# Patient Record
Sex: FEMALE | Race: WHITE | Employment: OTHER | URBAN - METROPOLITAN AREA
[De-identification: names, ages, dates, MRNs, and addresses within clinical notes are randomized per-mention and may not be internally consistent; named-entity substitution may affect disease eponyms.]

---

## 2017-03-10 DIAGNOSIS — Z12.31 ENCOUNTER FOR SCREENING MAMMOGRAM FOR BREAST CANCER: Primary | ICD-10-CM

## 2017-05-10 ENCOUNTER — APPOINTMENT (OUTPATIENT)
Dept: MAMMOGRAPHY | Facility: OTHER | Age: 67
End: 2017-05-10
Attending: NURSE PRACTITIONER
Payer: COMMERCIAL

## 2017-05-10 ENCOUNTER — OFFICE VISIT (OUTPATIENT)
Dept: FAMILY MEDICINE | Facility: OTHER | Age: 67
End: 2017-05-10
Attending: NURSE PRACTITIONER
Payer: COMMERCIAL

## 2017-05-10 VITALS
DIASTOLIC BLOOD PRESSURE: 70 MMHG | BODY MASS INDEX: 23.46 KG/M2 | SYSTOLIC BLOOD PRESSURE: 110 MMHG | HEIGHT: 66 IN | WEIGHT: 146 LBS | HEART RATE: 72 BPM | RESPIRATION RATE: 14 BRPM

## 2017-05-10 DIAGNOSIS — M85.80 OSTEOPENIA: ICD-10-CM

## 2017-05-10 DIAGNOSIS — K21.00 GASTROESOPHAGEAL REFLUX DISEASE WITH ESOPHAGITIS: ICD-10-CM

## 2017-05-10 DIAGNOSIS — Z00.00 ROUTINE GENERAL MEDICAL EXAMINATION AT A HEALTH CARE FACILITY: Primary | ICD-10-CM

## 2017-05-10 DIAGNOSIS — E78.5 HYPERLIPIDEMIA LDL GOAL <100: ICD-10-CM

## 2017-05-10 DIAGNOSIS — Z11.59 NEED FOR HEPATITIS C SCREENING TEST: ICD-10-CM

## 2017-05-10 DIAGNOSIS — Z71.89 ADVANCED CARE PLANNING/COUNSELING DISCUSSION: ICD-10-CM

## 2017-05-10 DIAGNOSIS — Z86.0100 HISTORY OF COLONIC POLYPS: ICD-10-CM

## 2017-05-10 DIAGNOSIS — Z79.899 TAKING A STATIN MEDICATION: ICD-10-CM

## 2017-05-10 DIAGNOSIS — Z78.0 MENOPAUSE PRESENT: ICD-10-CM

## 2017-05-10 LAB
ALBUMIN SERPL-MCNC: 4.4 G/DL (ref 3.4–5)
ALBUMIN UR-MCNC: NEGATIVE MG/DL
ALP SERPL-CCNC: 54 U/L (ref 40–150)
ALT SERPL W P-5'-P-CCNC: 31 U/L (ref 0–50)
ANION GAP SERPL CALCULATED.3IONS-SCNC: 11 MMOL/L (ref 3–14)
APPEARANCE UR: CLEAR
AST SERPL W P-5'-P-CCNC: 27 U/L (ref 0–45)
BILIRUB DIRECT SERPL-MCNC: 0.3 MG/DL (ref 0–0.2)
BILIRUB SERPL-MCNC: 1.3 MG/DL (ref 0.2–1.3)
BILIRUB UR QL STRIP: NEGATIVE
BUN SERPL-MCNC: 13 MG/DL (ref 7–30)
CALCIUM SERPL-MCNC: 9 MG/DL (ref 8.5–10.1)
CHLORIDE SERPL-SCNC: 104 MMOL/L (ref 94–109)
CHOLEST SERPL-MCNC: 174 MG/DL
CO2 SERPL-SCNC: 27 MMOL/L (ref 20–32)
COLOR UR AUTO: YELLOW
CREAT SERPL-MCNC: 0.9 MG/DL (ref 0.52–1.04)
GFR SERPL CREATININE-BSD FRML MDRD: 62 ML/MIN/1.7M2
GLUCOSE SERPL-MCNC: 96 MG/DL (ref 70–99)
GLUCOSE UR STRIP-MCNC: NEGATIVE MG/DL
HDLC SERPL-MCNC: 74 MG/DL
HGB UR QL STRIP: NEGATIVE
KETONES UR STRIP-MCNC: NEGATIVE MG/DL
LDLC SERPL CALC-MCNC: 77 MG/DL
LEUKOCYTE ESTERASE UR QL STRIP: NEGATIVE
NITRATE UR QL: NEGATIVE
NONHDLC SERPL-MCNC: 100 MG/DL
PH UR STRIP: 6 PH (ref 5–7)
POTASSIUM SERPL-SCNC: 3.7 MMOL/L (ref 3.4–5.3)
PROT SERPL-MCNC: 7.6 G/DL (ref 6.8–8.8)
SODIUM SERPL-SCNC: 142 MMOL/L (ref 133–144)
SP GR UR STRIP: 1.02 (ref 1–1.03)
TRIGL SERPL-MCNC: 114 MG/DL
TSH SERPL DL<=0.005 MIU/L-ACNC: 0.97 MU/L (ref 0.4–4)
URN SPEC COLLECT METH UR: NORMAL
UROBILINOGEN UR STRIP-ACNC: 0.2 EU/DL (ref 0.2–1)

## 2017-05-10 PROCEDURE — 80076 HEPATIC FUNCTION PANEL: CPT | Performed by: NURSE PRACTITIONER

## 2017-05-10 PROCEDURE — 99000 SPECIMEN HANDLING OFFICE-LAB: CPT | Performed by: NURSE PRACTITIONER

## 2017-05-10 PROCEDURE — 84443 ASSAY THYROID STIM HORMONE: CPT | Performed by: NURSE PRACTITIONER

## 2017-05-10 PROCEDURE — 36415 COLL VENOUS BLD VENIPUNCTURE: CPT | Performed by: NURSE PRACTITIONER

## 2017-05-10 PROCEDURE — 80061 LIPID PANEL: CPT | Performed by: NURSE PRACTITIONER

## 2017-05-10 PROCEDURE — G0202 SCR MAMMO BI INCL CAD: HCPCS | Mod: TC | Performed by: RADIOLOGY

## 2017-05-10 PROCEDURE — 86803 HEPATITIS C AB TEST: CPT | Mod: 90 | Performed by: NURSE PRACTITIONER

## 2017-05-10 PROCEDURE — 80048 BASIC METABOLIC PNL TOTAL CA: CPT | Performed by: NURSE PRACTITIONER

## 2017-05-10 PROCEDURE — 99397 PER PM REEVAL EST PAT 65+ YR: CPT | Performed by: NURSE PRACTITIONER

## 2017-05-10 PROCEDURE — 81003 URINALYSIS AUTO W/O SCOPE: CPT | Performed by: NURSE PRACTITIONER

## 2017-05-10 ASSESSMENT — ANXIETY QUESTIONNAIRES
5. BEING SO RESTLESS THAT IT IS HARD TO SIT STILL: NOT AT ALL
IF YOU CHECKED OFF ANY PROBLEMS ON THIS QUESTIONNAIRE, HOW DIFFICULT HAVE THESE PROBLEMS MADE IT FOR YOU TO DO YOUR WORK, TAKE CARE OF THINGS AT HOME, OR GET ALONG WITH OTHER PEOPLE: NOT DIFFICULT AT ALL
3. WORRYING TOO MUCH ABOUT DIFFERENT THINGS: SEVERAL DAYS
2. NOT BEING ABLE TO STOP OR CONTROL WORRYING: NOT AT ALL
1. FEELING NERVOUS, ANXIOUS, OR ON EDGE: NOT AT ALL
6. BECOMING EASILY ANNOYED OR IRRITABLE: NOT AT ALL
GAD7 TOTAL SCORE: 1
4. TROUBLE RELAXING: NOT AT ALL
7. FEELING AFRAID AS IF SOMETHING AWFUL MIGHT HAPPEN: NOT AT ALL

## 2017-05-10 NOTE — NURSING NOTE
"Chief Complaint   Patient presents with     Physical     mammo sceduled this morning, colonoscopy due     Chronic Disease Management     fasting       Initial /70 (BP Location: Right arm, Patient Position: Chair, Cuff Size: Adult Regular)  Pulse 72  Resp 14  Ht 5' 6\" (1.676 m)  Wt 146 lb (66.2 kg)  BMI 23.57 kg/m2 Estimated body mass index is 23.57 kg/(m^2) as calculated from the following:    Height as of this encounter: 5' 6\" (1.676 m).    Weight as of this encounter: 146 lb (66.2 kg).  Medication Reconciliation: complete     eDbora Rodriguez      "

## 2017-05-10 NOTE — MR AVS SNAPSHOT
After Visit Summary   5/10/2017    Farida Baldwin    MRN: [id number]           Patient Information     Date Of Birth          1950        Visit Information        Provider Department      5/10/2017 9:00 AM Irma Buchanan NP [facility]        Today's Diagnoses     Routine general medical examination at a health care facility    -  1    History of colonic polyps - tubular adenoma - colonoscopy every 2 years        Hyperlipidemia LDL goal <100        Taking a statin medication        Gastroesophageal reflux disease with esophagitis        Osteopenia        Need for hepatitis C screening test        Menopause present        Advanced care planning/counseling discussion          Care Instructions        1. Routine general medical examination at a health care facility  - UA reflex to Microscopic  - TSH with free T4 reflex  - Annual physical 1 year    2. History of colonic polyps - tubular adenoma - colonoscopy every 2 years  - Next colonoscopy is due in November    3. Hyperlipidemia LDL goal <100  - Medication as prescribed  -  Low fat diet  - Fish oil 3 daily  - Lipid Profile  - Basic metabolic panel  - TSH with free T4 reflex    4. Taking a statin medication  - Hepatic panel    5. Gastroesophageal reflux disease with esophagitis  - Continue plan of care    6. Osteopenia  - DX Hip/Pelvis/Spine  - Calcium (caltrate) per bottle daily  - Vitamin D3 2000U OTC  - If your bone density score is low lets consider Reclast infusion annually with repeat Dexa in 2 years    7. Need for hepatitis C screening test  - Hepatitis C antibody    8. Menopause present  - DX Hip/Pelvis/Spine - as above    9. Advanced care planning/counseling discussion  - Addressed        [facility]  [phone]            Follow-ups after your visit        Your next 10 appointments already scheduled     May 10, 2017 10:00 AM CDT   MAMMOGRAM with MT MAMMOGRAM   [facility] ([facility] )    [address] [address]   [phone]           Do not wear any body powder, lotions, deodorant or perfume the day of the exam. Bring a list of all medications, especially hormones.  If your last mammogram was not done at [facility], please bring your mammogram films. We will need the name of your MD/PA to send a copy of your report.              Who to contact     If you have questions or need follow up information about today's clinic visit or your schedule please contact [facility] directly at [phone].  Normal or non-critical lab and imaging results will be communicated to you by InterviewBesthart, letter or phone within 4 business days after the clinic has received the results. If you do not hear from us within 7 days, please contact the clinic through QuanDxt or phone. If you have a critical or abnormal lab result, we will notify you by phone as soon as possible.  Submit refill requests through [facility] or call your pharmacy and they will forward the refill request to us. Please allow 3 business days for your refill to be completed.          Additional Information About Your Visit        MyChart Information     [facility] lets you send messages to your doctor, view your test results, renew your prescriptions, schedule appointments and more. To sign up, go to [email or web address] . Click on \"Log in\" on the left side of the screen, which will take you to the Welcome page. Then click on \"Sign up Now\" on the right side of the page.     You will be asked to enter the access code listed below, as well as some personal information. Please follow the directions to create your username and password.     Your access code is: [email or web address]  Expires: 2017  9:50 AM     Your access code will  in 90 days. If you need help or a new code, please call your [facility] clinic or [phone].        Care EveryWhere ID     This is your Care EveryWhere ID. This could be used by other organizations to " "access your [place] medical records  [id number]        Your Vitals Were     Pulse Respirations Height BMI (Body Mass Index)          72 14 5' 6\" (1.676 m) 23.57 kg/m2         Blood Pressure from Last 3 Encounters:   05/10/17 110/70   05/04/16 116/70   02/12/16 130/76    Weight from Last 3 Encounters:   05/10/17 146 lb (66.2 kg)   05/04/16 154 lb (69.9 kg)   02/12/16 153 lb (69.4 kg)              We Performed the Following     *UA reflex to Microscopic     Basic metabolic panel     DX Hip/Pelvis/Spine     Hepatic panel     Hepatitis C antibody     Lipid Profile     TSH with free T4 reflex          Today's Medication Changes          These changes are accurate as of: 5/10/17  9:50 AM.  If you have any questions, ask your nurse or doctor.               These medicines have changed or have updated prescriptions.        Dose/Directions    aspirin 81 MG EC tablet   This may have changed:  when to take this   Used for:  Hyperlipidemia LDL goal < 100        Dose:  81 mg   Take 1 tablet (81 mg) by mouth daily   Quantity:  90 tablet   Refills:  3                Primary Care Provider Office Phone # Fax #    Irma JAMES Buchanan [phone]       [address] [address]        Thank you!     Thank you for choosing [facility]  for your care. Our goal is always to provide you with excellent care. Hearing back from our patients is one way we can continue to improve our services. Please take a few minutes to complete the written survey that you may receive in the mail after your visit with us. Thank you!             Your Updated Medication List - Protect others around you: Learn how to safely use, store and throw away your medicines at www.disposemymeds.org.          This list is accurate as of: 5/10/17  9:50 AM.  Always use your most recent med list.                   Brand Name Dispense Instructions for use    aspirin 81 MG EC tablet     90 tablet    Take 1 tablet (81 mg) by " mouth daily       calcium-vitamin D 600-400 MG-UNIT per tablet    CALTRATE     Take 1 tablet by mouth daily       MULTIVITAMIN/IRON PO      Take by mouth daily 1 daily       pantoprazole 40 MG EC tablet    PROTONIX    90 tablet    TAKE 1 TABLET BY MOUTH DAILY 30 TO 60 MINUTES BEFORE A MEAL       pravastatin 20 MG tablet    PRAVACHOL    90 tablet    TAKE 1 TABLET BY MOUTH ONCE DAILY       Vitamin C 500 MG Caps      Take  by mouth. 1 cap daily       VITAMIN D3 PO      Take 2,000 Units by mouth daily       VITAMIN E      1 tab daily

## 2017-05-10 NOTE — PATIENT INSTRUCTIONS
1. Routine general medical examination at a health care facility  - UA reflex to Microscopic  - TSH with free T4 reflex  - Annual physical 1 year    2. History of colonic polyps - tubular adenoma - colonoscopy every 2 years  - Next colonoscopy is due in November    3. Hyperlipidemia LDL goal <100  - Medication as prescribed  -  Low fat diet  - Fish oil 3 daily  - Lipid Profile  - Basic metabolic panel  - TSH with free T4 reflex    4. Taking a statin medication  - Hepatic panel    5. Gastroesophageal reflux disease with esophagitis  - Continue plan of care    6. Osteopenia  - DX Hip/Pelvis/Spine  - Calcium (caltrate) per bottle daily  - Vitamin D3 2000U OTC  - If your bone density score is low lets consider Reclast infusion annually with repeat Dexa in 2 years    7. Need for hepatitis C screening test  - Hepatitis C antibody    8. Menopause present  - DX Hip/Pelvis/Spine - as above    9. Advanced care planning/counseling discussion  - Addressed        Irma JAVED  [phone]

## 2017-05-10 NOTE — PROGRESS NOTES
CC:  Yearly Well-Woman Exam  Chief Complaint   Patient presents with     Physical     mammo sceduled this morning, colonoscopy due     Chronic Disease Management     fasting       HPI:  Farida Baldwin is a 66 year old female who present today for yearly exam.        Mammo will be done today.  Last was 3/16 - normal  Last Dexa scan - 3 years, is due  Last colonoscopy - UTD, due in November - Dr. Benjamin  BSE monthly  Dental and Eye examinations are UTD      BONE DENSITOMETRY  CLINICAL HISTORY: Postmenopausal female. Screening for  osteoporosis.  COMPARISON: None.  FINDINGS: Bone mineral density in the left femoral neck is 0.725  G/cm2, with a T-score of -1.1. Bone mineral density in the L1  through L4 vertebral bodies is 1.007 G/cm2, with a T-score of -0.4.     IMPRESSION:   OSTEOPENIA.     Exam Date: Apr 25, 2014 11:05:00 AM  Author: MAXIMILIANO MCCURDY  This report is final and signed      Past Medical History:   Diagnosis Date     Gastroesophageal reflux disease with esophagitis 1/27/2016     Hyperlipidemia LDL goal < 100 9/7/2012     Osteopenia 4/25/2014     Taking a statin medication 5/10/2017     Tubulovillous adenoma polyp of colon 9/7/2012       Past Surgical History:   Procedure Laterality Date     APPENDECTOMY  1956     BIOPSY      polypectomy-benign     COLONOSCOPY  5/17/2012     COLONOSCOPY  5/30/2013    Procedure: COLONOSCOPY;  COLONOSCOPY WITH BIOPSIES;  Surgeon: Tana Benjamin MD;  Location: [facility]     COLONOSCOPY  11/12/2013    Procedure: COLONOSCOPY;  WHOLE COLON COLONOSCOPY WITH POLYPECTOMY;  Surgeon: Tana Benjamin MD;  Location: [facility]     COLONOSCOPY N/A 11/23/2015    Procedure: COLONOSCOPY;  Surgeon: Tana Benjamin MD;  Location: [facility]     ESOPHAGOSCOPY, GASTROSCOPY, DUODENOSCOPY (EGD), COMBINED N/A 2/12/2016    Procedure: COMBINED ESOPHAGOSCOPY, GASTROSCOPY, DUODENOSCOPY (EGD);  Surgeon: Arthur Alaniz DO;  Location: [facility]     GYN SURGERY      complete hyst     Laproscopic-assisted  "resection of right colon neoplasm  2012     TONSILLECTOMY  1955       Current Outpatient Prescriptions   Medication     pantoprazole (PROTONIX) 40 MG enteric coated tablet     pravastatin (PRAVACHOL) 20 MG tablet     Cholecalciferol (VITAMIN D3 PO)     calcium-vitamin D (CALTRATE) 600-400 MG-UNIT per tablet     aspirin 81 MG EC tablet     Multiple Vitamins-Iron (MULTIVITAMIN/IRON PO)     VITAMIN E     Ascorbic Acid (VITAMIN C) 500 MG CAPS     No current facility-administered medications for this visit.        Allergies   Allergen Reactions     Fosamax [Alendronic Acid] Cramps     Muscle cramps       Family History   Problem Relation Age of Onset     Other - See Comments Father      (82 Diagnosis); cause of death     C.A.D. Father      CANCER Mother 38     pancreatic       Social History     Social History     Marital status:      Spouse name: N/A     Number of children: N/A     Years of education: N/A     Social History Main Topics     Smoking status: Former Smoker     Packs/day: 1.00     Years: 25.00     Types: Cigarettes     Smokeless tobacco: Never Used      Comment: year quit 1989     Alcohol use Yes      Comment: 1 beer daily. last drink, last night.     Drug use: No     Sexual activity: Yes     Partners: Male     Other Topics Concern     Blood Transfusions Yes     Caffeine Concern Yes     coffee - 4 cups daily     Parent/Sibling W/ Cabg, Mi Or Angioplasty Before 65f 55m? No     Social History Narrative       REVIEW OF SYSTEMS  Skin: negative  Eyes: negative  Ears/Nose/Throat: negative  Respiratory: No concerns  Breast:  Self examinations normal  Cardiovascular: negative  Gastrointestinal: negative  Genitourinary: negative  Musculoskeletal: negative  Neurologic: negative  Psychiatric: negative  Hematologic/Lymphatic/Immunologic: negative  Endocrine: negative      OBJECTIVE:  /70 (BP Location: Right arm, Patient Position: Chair, Cuff Size: Adult Regular)  Pulse 72  Resp 14  Ht 5' 6\" (1.676 m)  " Wt 146 lb (66.2 kg)  BMI 23.57 kg/m2     Constitutional: healthy, alert, no distress and cooperative  Head: Normocephalic. No masses, lesions, tenderness or abnormalities  Neck: Neck supple. No adenopathy. Thyroid symmetric, normal size,, Carotids without bruits.  ENT: ENT exam normal, no neck nodes or sinus tenderness  Cardiovascular: negative, PMI normal. No lifts, heaves, or thrills. RRR. No murmurs, clicks gallops or rub  Respiratory: negative, Percussion normal. Good diaphragmatic excursion. Lungs clear  Breast:  Examination normal  Gastrointestinal: Abdomen soft, non-tender. BS normal. No masses, organomegaly  Musculoskeletal: extremities normal- no gross deformities noted, gait normal and normal muscle tone  Skin: no suspicious lesions or rashes  Neurologic: Gait normal. Reflexes normal and symmetric. Sensation grossly WNL.  Psychiatric: mentation appears normal and affect normal/bright  Hematologic/Lymphatic/Immunologic: normal ant/post cervical, axillary, supraclavicular nodes      PHQ-9 SCORE 1/27/2016 5/4/2016 5/10/2017   Total Score - - -   Total Score 0 0 0     JENNIFER-7 SCORE 5/10/2017   Total Score 1         1. Routine general medical examination at a health care facility  - UA reflex to Microscopic  - TSH with free T4 reflex  - Annual physical 1 year    2. History of colonic polyps - tubular adenoma - colonoscopy every 2 years  - Next colonoscopy is due in November    3. Hyperlipidemia LDL goal <100  - Medication as prescribed  -  Low fat diet  - Fish oil 3 daily  - Lipid Profile  - Basic metabolic panel  - TSH with free T4 reflex    4. Taking a statin medication  - Hepatic panel    5. Gastroesophageal reflux disease with esophagitis  - Continue plan of care    6. Osteopenia  - DX Hip/Pelvis/Spine  - Calcium (caltrate) per bottle daily  - Vitamin D3 2000U OTC  - If your bone density score is low lets consider Reclast infusion annually with repeat Dexa in 2 years    7. Need for hepatitis C screening  test  - Hepatitis C antibody    8. Menopause present  - DX Hip/Pelvis/Spine - as above    9. Advanced care planning/counseling discussion  - Addressed        [facility]  [phone]

## 2017-05-11 LAB — HCV AB SERPL QL IA: NORMAL

## 2017-05-11 ASSESSMENT — PATIENT HEALTH QUESTIONNAIRE - PHQ9: SUM OF ALL RESPONSES TO PHQ QUESTIONS 1-9: 0

## 2017-05-11 ASSESSMENT — ANXIETY QUESTIONNAIRES: GAD7 TOTAL SCORE: 1

## 2017-06-22 DIAGNOSIS — E78.5 HYPERLIPIDEMIA LDL GOAL <100: ICD-10-CM

## 2017-06-22 RX ORDER — PRAVASTATIN SODIUM 20 MG
20 TABLET ORAL DAILY
Qty: 90 TABLET | Refills: 2 | Status: SHIPPED | OUTPATIENT
Start: 2017-06-22 | End: 2017-11-21

## 2017-08-01 DIAGNOSIS — K21.00 GASTROESOPHAGEAL REFLUX DISEASE WITH ESOPHAGITIS: ICD-10-CM

## 2017-08-01 RX ORDER — PANTOPRAZOLE SODIUM 40 MG/1
TABLET, DELAYED RELEASE ORAL
Qty: 90 TABLET | Refills: 2 | Status: SHIPPED | OUTPATIENT
Start: 2017-08-01 | End: 2019-05-16

## 2017-11-14 ENCOUNTER — TELEPHONE (OUTPATIENT)
Dept: FAMILY MEDICINE | Facility: OTHER | Age: 67
End: 2017-11-14

## 2017-11-14 NOTE — TELEPHONE ENCOUNTER
Pt calls, reports had blood on paper after having a BM this morning.  Has zHX of polyps.  Due for colonoscopy this month, gets every 2 yrs per Dr. Benjamin.  Trying to get ahold of her office to schedule appt.  Transferred her to the nurse.  Also asked pt to call if needs help scheduling.

## 2017-11-21 ENCOUNTER — OFFICE VISIT (OUTPATIENT)
Dept: SURGERY | Facility: OTHER | Age: 67
End: 2017-11-21
Attending: SURGERY
Payer: COMMERCIAL

## 2017-11-21 VITALS
BODY MASS INDEX: 23.3 KG/M2 | SYSTOLIC BLOOD PRESSURE: 115 MMHG | WEIGHT: 145 LBS | OXYGEN SATURATION: 95 % | HEART RATE: 73 BPM | HEIGHT: 66 IN | RESPIRATION RATE: 18 BRPM | DIASTOLIC BLOOD PRESSURE: 75 MMHG | TEMPERATURE: 98.2 F

## 2017-11-21 DIAGNOSIS — Z86.0101 HISTORY OF ADENOMATOUS POLYP OF COLON: ICD-10-CM

## 2017-11-21 DIAGNOSIS — Z12.11 SPECIAL SCREENING FOR MALIGNANT NEOPLASMS, COLON: Primary | ICD-10-CM

## 2017-11-21 PROCEDURE — 99203 OFFICE O/P NEW LOW 30 MIN: CPT | Performed by: SURGERY

## 2017-11-21 RX ORDER — SODIUM, POTASSIUM,MAG SULFATES 17.5-3.13G
2 SOLUTION, RECONSTITUTED, ORAL ORAL SEE ADMIN INSTRUCTIONS
Qty: 2 BOTTLE | Refills: 0 | Status: SHIPPED | OUTPATIENT
Start: 2017-11-21 | End: 2018-02-21

## 2017-11-21 ASSESSMENT — PAIN SCALES - GENERAL: PAINLEVEL: NO PAIN (0)

## 2017-11-21 NOTE — PROGRESS NOTES
- most likely due to dehydration  - cont IVFs and monitor   Surgery Consult Clinic Note      RE: Farida Baldwin  : 1950    Chief Complaint:  colonoscopy    History of Present Illness:  Mrs. Baldwin is a very pleasant 67 year old female who I am seeing at the request of Irma Buchanan for evaluation of screening colon malignant neoplasm and consideration for colonoscopy.  She denies family history of colon or rectal cancer, blood in stool, changes in bowel habits, weight loss, abdominal pain.  Her colonoscopy in  was positive for a atypical adenomatous colon polyp. She underwent short interval colonoscopy in  that was positive for a large tubular adenoma at the hepatic flexure with a tattoo.  I have personally reviewed this operative and pathology report.  She was advised to undergo surveillance because of the short interval positive polyp.  She specifically denies fever, chills, nausea, vomiting, chest pain, shortness of breath or palpitations.      Medical history:  Past Medical History:   Diagnosis Date     Gastroesophageal reflux disease with esophagitis 2016     Hyperlipidemia LDL goal < 100 2012     Osteopenia 2014     Taking a statin medication 5/10/2017     Tubulovillous adenoma polyp of colon 2012       Surgical history:  Past Surgical History:   Procedure Laterality Date     APPENDECTOMY       BIOPSY      polypectomy-benign     COLONOSCOPY  2012     COLONOSCOPY  2013    Procedure: COLONOSCOPY;  COLONOSCOPY WITH BIOPSIES;  Surgeon: Tana Benjamin MD;  [facility]: [facility]     COLONOSCOPY  2013    Procedure: COLONOSCOPY;  WHOLE COLON COLONOSCOPY WITH POLYPECTOMY;  Surgeon: Tana Benjamin MD;  [facility]     COLONOSCOPY N/A 2015    Procedure: COLONOSCOPY;  Surgeon: Tana Benjamin MD;  [facility]     ESOPHAGOSCOPY, GASTROSCOPY, DUODENOSCOPY (EGD), COMBINED N/A 2016    Procedure: COMBINED ESOPHAGOSCOPY, GASTROSCOPY, DUODENOSCOPY (EGD);  Surgeon: Arthur Alaniz DO;  Location: [facility]     GYN  SURGERY      complete hyst     Laproscopic-assisted resection of right colon neoplasm  2012     TONSILLECTOMY  1955       Family history:  Family History   Problem Relation Age of Onset     Other - See Comments Father      (82 Diagnosis); cause of death     C.A.D. Father      CANCER Mother 38     pancreatic       Medications:  Current Outpatient Prescriptions   Medication Sig Dispense Refill     pantoprazole (PROTONIX) 40 MG EC tablet TAKE 1 TABLET BY MOUTH DAILY 30 TO 60 MINUTES BEFORE A MEAL 90 tablet 2     pravastatin (PRAVACHOL) 20 MG tablet Take 1 tablet (20 mg) by mouth daily 90 tablet 2     Cholecalciferol (VITAMIN D3 PO) Take 2,000 Units by mouth daily        calcium-vitamin D (CALTRATE) 600-400 MG-UNIT per tablet Take 1 tablet by mouth daily        aspirin 81 MG EC tablet Take 1 tablet (81 mg) by mouth daily (Patient taking differently: Take 81 mg by mouth three times a week ) 90 tablet 3     Multiple Vitamins-Iron (MULTIVITAMIN/IRON PO) Take by mouth daily 1 daily       VITAMIN E 1 tab daily       Ascorbic Acid (VITAMIN C) 500 MG CAPS Take  by mouth. 1 cap daily         Allergies:  The patientis allergic to fosamax [alendronic acid].  .  Social history:  Social History   Substance Use Topics     Smoking status: Former Smoker     Packs/day: 1.00     Years: 25.00     Types: Cigarettes     Smokeless tobacco: Never Used      Comment: year quit 1989     Alcohol use Yes      Comment: 1 beer daily. last drink, last night.     Marital status: .    Review of Systems:    Constitutional: Negative for fever, chills and weight loss.   HENT: Negative for ear pain, nosebleeds, congestion, sore throat, tinnitus and ear discharge.    Eyes: Negative for blurred vision, double vision, photophobia and pain.   Respiratory: Negative for cough, hemoptysis, shortness of breath, wheezing and stridor.    Cardiovascular: Negative for chest pain, palpitations and orthopnea.   Gastrointestinal: Negative for heartburn, nausea,  "vomiting, abdominal pain and blood in stool.   Genitourinary: Negative for urgency, frequency and hematuria.   Musculoskeletal: Negative for myalgias, back pain and joint pain.   Neurological: Negative for tingling, speech change and headaches.   Endo/Heme/Allergies: Does not bruise/bleed easily.   Psychiatric/Behavioral: Negative for depression, suicidal ideas and hallucinations. The patient is not nervous/anxious.    Physical Examination:  /75  Pulse 73  Temp 98.2  F (36.8  C)  Resp 18  Ht 5' 6\" (1.676 m)  Wt 145 lb (65.8 kg)  SpO2 95%  BMI 23.4 kg/m2  General: AAOx4, NAD, WN/WD, ambulating without assistance  HEENT:NCAT, EOMI, PERRL Sclerae anicteric; Trachea mideline, no JVD  Chest:   Clear to auscultation bilaterally.  Cardiac: S1S2 , regular rate and rhythm without additional sounds  Abdomen: Soft, ND/NT no rebound, no guarding, well healed right oblique incision  Extremities: Cursory exam unremarkable.  Skin: Warm, dry, < 2 sec cap refill  Neuro: CN 2-12 grossly intact, no focal deficit, GCS 15  Psych: happy, calm, asks appropriate questions      Assessment/Plan:  #1 Screening colon malignant neoplasm  #2 Personal history of adenomatous colon polyps    Thank you for the consult.  Mrs. Baldwin and I had a long and imelda discussion about colonoscopies.  The indications, risks, benefits, althernatives and technical aspects of whole colon colonoscopy were outlined with risks including, but not limited to, perforation, bleeding and inability to visualize entire colon.  Management of each was reviewed including the risk for life saving surgery and possible admittance to the ICU.  The need of mechanical preparation of the colon was reviewed along with the use of monitored anesthetic care which is needed to ensure proper visualization and safety concerns should biopsy be needed.  The patient's questions were asked and answered.  Scheduled first available date.          Dr Alaniz  [facility] " [facility]  [address], [address]  [place]    [address]    Referring Provider:  No referring provider defined for this encounter.     Primary Care Provider:  Irma Buchanan

## 2017-11-21 NOTE — PATIENT INSTRUCTIONS
Thank you for allowing Dr. Alaniz and our surgical team to participate in your care.  If you have a scheduling or an appointment question please contact [facility] Unit Coordinator at her direct line [phone].   ALL nursing questions or concerns can be directed to Stephanie at: [phone]     You are scheduled for a: colonoscopy  Your procedure date is: 12/13/17    You need a friend or family member available to drive you home AND stay with you for 24 hours after you leave the hospital. You will not be allowed to drive yourself. IF you need to take a taxi or the bus you MUST have a responsible person to ride with you. YOUR PROCEDURE WILL BE CANCELLED IF YOU DO NOT HAVE A RESPONSIBLE ADULT TO DRIVE YOU HOME.       You CANNOT have anything to eat or drink after midnight the night before your surgery, ncluding water and coffee. Your stomach needs to be completely empty. Do NOT chew gum, suck on hard candy, or smoke. You can brush your teeth the morning of surgery.       You need to call our Surgery Education Nurses 1-2 weeks prior to your surgery date at  [phone] or toll free [phone]. Please have you medication and allergy lists ready.      Stop your aspirin or other NSAIDs(Ibuprofen, Motrin, Aleve, Celebrex, Naproxen, etc...) 7 days before your surgery.      Hospital admitting will call you the day before your surgery with your arrival time. If you are scheduled on a Monday admitting will call you the Friday before.      Please call your primary care physician if you should become ill within 24 hours of scheduled surgery. (ex.vomiting, diarrhea, fever)  Surgery Education will contact you the day before your procedure between the hours of noon and 5 pm with the time you need to register in admitting at the hospital. Call Stephanie with any questions [phone]  You will need to stop your multivitamin for the five days you are on the low fiber diet.   Day of surgery hold all medications until  you arrive home from your procedure at which time you may resume all of them like normal.

## 2017-11-21 NOTE — NURSING NOTE
"Chief Complaint   Patient presents with     Consult     last colonoscopy 2015/ no family history of colon cancer/ no changes in bowels        Initial /75  Pulse 73  Temp 98.2  F (36.8  C)  Resp 18  Ht 5' 6\" (1.676 m)  Wt 145 lb (65.8 kg)  SpO2 95%  BMI 23.4 kg/m2 Estimated body mass index is 23.4 kg/(m^2) as calculated from the following:    Height as of this encounter: 5' 6\" (1.676 m).    Weight as of this encounter: 145 lb (65.8 kg).  Medication Reconciliation: complete   Estefanía Frances    "

## 2017-12-13 ENCOUNTER — HOSPITAL ENCOUNTER (OUTPATIENT)
Facility: HOSPITAL | Age: 67
Discharge: HOME | End: 2017-12-13
Attending: SURGERY | Admitting: SURGERY
Payer: COMMERCIAL

## 2017-12-13 ENCOUNTER — ANESTHESIA EVENT (OUTPATIENT)
Dept: SURGERY | Facility: HOSPITAL | Age: 67
End: 2017-12-13
Payer: COMMERCIAL

## 2017-12-13 ENCOUNTER — ANESTHESIA (OUTPATIENT)
Dept: SURGERY | Facility: HOSPITAL | Age: 67
End: 2017-12-13
Payer: COMMERCIAL

## 2017-12-13 VITALS
RESPIRATION RATE: 16 BRPM | OXYGEN SATURATION: 88 % | WEIGHT: 144 LBS | DIASTOLIC BLOOD PRESSURE: 70 MMHG | SYSTOLIC BLOOD PRESSURE: 126 MMHG | TEMPERATURE: 97.7 F | BODY MASS INDEX: 23.24 KG/M2

## 2017-12-13 PROCEDURE — 36000050 ZZH SURGERY LEVEL 2 1ST 30 MIN: Performed by: SURGERY

## 2017-12-13 PROCEDURE — 45385 COLONOSCOPY W/LESION REMOVAL: CPT | Mod: PT | Performed by: ANESTHESIOLOGY

## 2017-12-13 PROCEDURE — 37000009 ZZH ANESTHESIA TECHNICAL FEE, EACH ADDTL 15 MIN: Performed by: SURGERY

## 2017-12-13 PROCEDURE — 01999 UNLISTED ANES PROCEDURE: CPT | Performed by: NURSE ANESTHETIST, CERTIFIED REGISTERED

## 2017-12-13 PROCEDURE — 25000128 H RX IP 250 OP 636: Performed by: NURSE ANESTHETIST, CERTIFIED REGISTERED

## 2017-12-13 PROCEDURE — 45380 COLONOSCOPY AND BIOPSY: CPT | Mod: PT | Performed by: SURGERY

## 2017-12-13 PROCEDURE — 25000125 ZZHC RX 250: Performed by: NURSE ANESTHETIST, CERTIFIED REGISTERED

## 2017-12-13 PROCEDURE — 37000008 ZZH ANESTHESIA TECHNICAL FEE, 1ST 30 MIN: Performed by: SURGERY

## 2017-12-13 PROCEDURE — 27210794 ZZH OR GENERAL SUPPLY STERILE: Performed by: SURGERY

## 2017-12-13 PROCEDURE — 71000027 ZZH RECOVERY PHASE 2 EACH 15 MINS: Performed by: SURGERY

## 2017-12-13 PROCEDURE — 88305 TISSUE EXAM BY PATHOLOGIST: CPT | Mod: TC | Performed by: SURGERY

## 2017-12-13 PROCEDURE — 36000052 ZZH SURGERY LEVEL 2 EA 15 ADDTL MIN: Performed by: SURGERY

## 2017-12-13 PROCEDURE — 40000305 ZZH STATISTIC PRE PROC ASSESS I: Performed by: SURGERY

## 2017-12-13 PROCEDURE — 25000128 H RX IP 250 OP 636: Performed by: ANESTHESIOLOGY

## 2017-12-13 RX ORDER — HYDRALAZINE HYDROCHLORIDE 20 MG/ML
2.5-5 INJECTION INTRAMUSCULAR; INTRAVENOUS EVERY 10 MIN PRN
Status: DISCONTINUED | OUTPATIENT
Start: 2017-12-13 | End: 2017-12-13 | Stop reason: HOSPADM

## 2017-12-13 RX ORDER — SODIUM CHLORIDE, SODIUM LACTATE, POTASSIUM CHLORIDE, CALCIUM CHLORIDE 600; 310; 30; 20 MG/100ML; MG/100ML; MG/100ML; MG/100ML
INJECTION, SOLUTION INTRAVENOUS CONTINUOUS
Status: DISCONTINUED | OUTPATIENT
Start: 2017-12-13 | End: 2017-12-13 | Stop reason: HOSPADM

## 2017-12-13 RX ORDER — FENTANYL CITRATE 50 UG/ML
25-50 INJECTION, SOLUTION INTRAMUSCULAR; INTRAVENOUS
Status: DISCONTINUED | OUTPATIENT
Start: 2017-12-13 | End: 2017-12-13 | Stop reason: HOSPADM

## 2017-12-13 RX ORDER — LIDOCAINE HYDROCHLORIDE 20 MG/ML
INJECTION, SOLUTION INFILTRATION; PERINEURAL PRN
Status: DISCONTINUED | OUTPATIENT
Start: 2017-12-13 | End: 2017-12-13

## 2017-12-13 RX ORDER — ONDANSETRON 2 MG/ML
4 INJECTION INTRAMUSCULAR; INTRAVENOUS EVERY 30 MIN PRN
Status: DISCONTINUED | OUTPATIENT
Start: 2017-12-13 | End: 2017-12-13 | Stop reason: HOSPADM

## 2017-12-13 RX ORDER — DEXAMETHASONE SODIUM PHOSPHATE 4 MG/ML
4 INJECTION, SOLUTION INTRA-ARTICULAR; INTRALESIONAL; INTRAMUSCULAR; INTRAVENOUS; SOFT TISSUE EVERY 10 MIN PRN
Status: DISCONTINUED | OUTPATIENT
Start: 2017-12-13 | End: 2017-12-13 | Stop reason: HOSPADM

## 2017-12-13 RX ORDER — PROMETHAZINE HYDROCHLORIDE 25 MG/ML
12.5 INJECTION, SOLUTION INTRAMUSCULAR; INTRAVENOUS
Status: DISCONTINUED | OUTPATIENT
Start: 2017-12-13 | End: 2017-12-13 | Stop reason: HOSPADM

## 2017-12-13 RX ORDER — MEPERIDINE HYDROCHLORIDE 25 MG/ML
12.5 INJECTION INTRAMUSCULAR; INTRAVENOUS; SUBCUTANEOUS
Status: DISCONTINUED | OUTPATIENT
Start: 2017-12-13 | End: 2017-12-13 | Stop reason: HOSPADM

## 2017-12-13 RX ORDER — ALBUTEROL SULFATE 0.83 MG/ML
2.5 SOLUTION RESPIRATORY (INHALATION) EVERY 4 HOURS PRN
Status: DISCONTINUED | OUTPATIENT
Start: 2017-12-13 | End: 2017-12-13 | Stop reason: HOSPADM

## 2017-12-13 RX ORDER — LIDOCAINE 40 MG/G
CREAM TOPICAL
Status: DISCONTINUED | OUTPATIENT
Start: 2017-12-13 | End: 2017-12-13 | Stop reason: HOSPADM

## 2017-12-13 RX ORDER — NALOXONE HYDROCHLORIDE 0.4 MG/ML
.1-.4 INJECTION, SOLUTION INTRAMUSCULAR; INTRAVENOUS; SUBCUTANEOUS
Status: DISCONTINUED | OUTPATIENT
Start: 2017-12-13 | End: 2017-12-13 | Stop reason: HOSPADM

## 2017-12-13 RX ORDER — PROPOFOL 10 MG/ML
INJECTION, EMULSION INTRAVENOUS PRN
Status: DISCONTINUED | OUTPATIENT
Start: 2017-12-13 | End: 2017-12-13

## 2017-12-13 RX ORDER — ONDANSETRON 4 MG/1
4 TABLET, ORALLY DISINTEGRATING ORAL EVERY 30 MIN PRN
Status: DISCONTINUED | OUTPATIENT
Start: 2017-12-13 | End: 2017-12-13 | Stop reason: HOSPADM

## 2017-12-13 RX ADMIN — MIDAZOLAM 1 MG: 1 INJECTION INTRAMUSCULAR; INTRAVENOUS at 09:18

## 2017-12-13 RX ADMIN — PROPOFOL 20 MG: 10 INJECTION, EMULSION INTRAVENOUS at 09:22

## 2017-12-13 RX ADMIN — PROPOFOL 20 MG: 10 INJECTION, EMULSION INTRAVENOUS at 09:23

## 2017-12-13 RX ADMIN — PROPOFOL 20 MG: 10 INJECTION, EMULSION INTRAVENOUS at 09:37

## 2017-12-13 RX ADMIN — PROPOFOL 20 MG: 10 INJECTION, EMULSION INTRAVENOUS at 09:29

## 2017-12-13 RX ADMIN — PROPOFOL 20 MG: 10 INJECTION, EMULSION INTRAVENOUS at 09:45

## 2017-12-13 RX ADMIN — PROPOFOL 20 MG: 10 INJECTION, EMULSION INTRAVENOUS at 09:41

## 2017-12-13 RX ADMIN — PROPOFOL 20 MG: 10 INJECTION, EMULSION INTRAVENOUS at 09:33

## 2017-12-13 RX ADMIN — PROPOFOL 20 MG: 10 INJECTION, EMULSION INTRAVENOUS at 09:27

## 2017-12-13 RX ADMIN — PROPOFOL 20 MG: 10 INJECTION, EMULSION INTRAVENOUS at 09:21

## 2017-12-13 RX ADMIN — SODIUM CHLORIDE, POTASSIUM CHLORIDE, SODIUM LACTATE AND CALCIUM CHLORIDE 1000 ML: 600; 310; 30; 20 INJECTION, SOLUTION INTRAVENOUS at 07:54

## 2017-12-13 RX ADMIN — LIDOCAINE HYDROCHLORIDE 40 MG: 20 INJECTION, SOLUTION INFILTRATION; PERINEURAL at 09:20

## 2017-12-13 RX ADMIN — PROPOFOL 20 MG: 10 INJECTION, EMULSION INTRAVENOUS at 09:20

## 2017-12-13 RX ADMIN — PROPOFOL 20 MG: 10 INJECTION, EMULSION INTRAVENOUS at 09:25

## 2017-12-13 ASSESSMENT — LIFESTYLE VARIABLES: TOBACCO_USE: 1

## 2017-12-13 NOTE — IP AVS SNAPSHOT
MRN:[id number]                      After Visit Summary   12/13/2017    Farida Baldwin    MRN: [id number]           Thank you!     Thank you for choosing [facility] for your care. Our goal is always to provide you with excellent care. Hearing back from our patients is one way we can continue to improve our services. Please take a few minutes to complete the written survey that you may receive in the mail after you visit with us. Thank you!        Patient Information     Date Of Birth          1950        About your hospital stay     You were admitted on:  December 13, 2017 You last received care in the:  [facility]    You were discharged on:  December 13, 2017       Who to Call     For medical emergencies, please call 911.  For non-urgent questions about your medical care, please call your primary care provider or clinic, [phone]  For questions related to your surgery, please call your surgery clinic        Attending Provider     Provider Specialty    Arthur Alaniz, DO Surgery       Primary Care Provider Office Phone # Fax #    Irma ZarcoJAMES shepard [phone]      Further instructions from your care team           Post-Anesthesia Patient Instructions    IMMEDIATELY FOLLOWING SURGERY:  Do not drive or operate machinery for the first twenty four hours after surgery.  Do not make any important decisions for twenty four hours after surgery or while taking narcotic pain medications or sedatives.  If you develop intractable nausea and vomiting or a severe headache please notify your doctor immediately.    FOLLOW-UP:  Please make an appointment with your surgeon as instructed. You do not need to follow up with anesthesia unless specifically instructed to do so.    WOUND CARE INSTRUCTIONS (if applicable):  Keep a dry clean dressing on the anesthesia/puncture wound site if there is drainage.  Once the wound has quit draining you may leave it open to air.  Generally you  should leave the bandage intact for twenty four hours unless there is drainage.  If the epidural site drains for more than 36-48 hours please call the anesthesia department.    QUESTIONS?:  Please feel free to call your physician or the hospital  if you have any questions, and they will be happy to assist you.           INSTRUCTIONS AFTER COLONOSCOPY    WHEN YOU ARE BACK HOME:    Plan to rest for an hour or two after you get home.    You may have some cramping or pressure until you pass gas.    You may resume your regular medications.    Eat a small, light meal at first, and then gradually return to normal meal sizes.  If you had a polyp removed:    Slight bleeding may occur.  You may have a slight blood stain on the toilet paper after a bowel movement.    To lessen the chance of bleeding, avoid heavy exercise for ONE WEEK.  This includes heavy lifting, vigorous sport activities, and heavy physical labor.  You may resume your normal sexual activity.      Avoid aspirin or aspirin products if instructed by your doctor.    WHAT TO WATCH FOR:  Problems rarely occur after the exam; however, it is important for you to watch for early signs of possible problems.  If you have     Unusual pain in your abdomen    Nausea and vomiting that persists    Excessive bleeding    Black or bloody bowel movements    Fever or temperature above 100.6 F  Please call your doctor ([facility] [phone]) or go to the nearest hospital emergency room.    Pending Results     No orders found from 12/11/2017 to 12/14/2017.            Admission Information     Date & Time Provider Department Dept. Phone    12/13/2017 Arthur Alaniz,  [facility] [phone]      Your Vitals Were     Blood Pressure Temperature Respirations Weight Pulse Oximetry BMI (Body Mass Index)    104/69 97.7  F (36.5  C) (Oral) 16 65.3 kg (144 lb) 97% 23.24 kg/m2      MyChart Information     [employer] lets you send messages to your doctor, view  "your test results, renew your prescriptions, schedule appointments and more. To sign up, go to [email or web address] . Click on \"Log in\" on the left side of the screen, which will take you to the Welcome page. Then click on \"Sign up Now\" on the right side of the page.     You will be asked to enter the access code listed below, as well as some personal information. Please follow the directions to create your username and password.     Your access code is: [email or web address]  Expires: 2018 11:27 AM     Your access code will  in 90 days. If you need help or a new code, please call your [facility] clinic or [phone].        Care EveryWhere ID     This is your Care EveryWhere ID. This could be used by other organizations to access your [facility] medical records  [id number]        Equal Access to Services     LILA CONRAD : Meghna Leums, michelle dover, chris cortez, carolynn rogers . So [facility] [phone].    ATENCIÓN: Si habla español, tiene a valle disposición servicios gratuitos de asistencia lingüística. [place] al [phone].    We comply with applicable federal civil rights laws and [place] laws. We do not discriminate on the basis of race, color, national origin, age, disability, sex, sexual orientation, or gender identity.               Review of your medicines      CONTINUE these medicines which may have CHANGED, or have new prescriptions. If we are uncertain of the size of tablets/capsules you have at home, strength may be listed as something that might have changed.        Dose / Directions    aspirin 81 MG EC tablet   This may have changed:  when to take this   Used for:  Hyperlipidemia LDL goal < 100        Dose:  81 mg   Take 1 tablet (81 mg) by mouth daily   Quantity:  90 tablet   Refills:  3         CONTINUE these medicines which have NOT CHANGED        Dose / Directions    calcium-vitamin D 600-400 MG-UNIT per tablet   Commonly known as:  " CALTRATE        Dose:  1 tablet   Take 1 tablet by mouth daily   Refills:  0       MULTIVITAMIN/IRON PO        Take by mouth daily 1 daily   Refills:  0       Na Sulfate-K Sulfate-Mg Sulf solution   Commonly known as:  SUPREP BOWEL PREP   Used for:  Special screening for malignant neoplasms, colon        Dose:  2 Bottle   Take 354 mLs (2 Bottles) by mouth See Admin Instructions   Quantity:  2 Bottle   Refills:  0       pantoprazole 40 MG EC tablet   Commonly known as:  PROTONIX   Used for:  Gastroesophageal reflux disease with esophagitis        TAKE 1 TABLET BY MOUTH DAILY 30 TO 60 MINUTES BEFORE A MEAL   Quantity:  90 tablet   Refills:  2       PRAVASTATIN SODIUM PO        Dose:  40 mg   Take 40 mg by mouth daily   Refills:  0       Vitamin C 500 MG Caps        Take  by mouth. 1 cap daily   Refills:  0       VITAMIN D3 PO        Dose:  2000 Units   Take 2,000 Units by mouth daily   Refills:  0       VITAMIN E        1 tab daily   Refills:  0                Protect others around you: Learn how to safely use, store and throw away your medicines at www.disposemymeds.org.             Medication List: This is a list of all your medications and when to take them. Check marks below indicate your daily home schedule. Keep this list as a reference.      Medications           Morning Afternoon Evening Bedtime As Needed    aspirin 81 MG EC tablet   Take 1 tablet (81 mg) by mouth daily                                calcium-vitamin D 600-400 MG-UNIT per tablet   Commonly known as:  CALTRATE   Take 1 tablet by mouth daily                                MULTIVITAMIN/IRON PO   Take by mouth daily 1 daily                                Na Sulfate-K Sulfate-Mg Sulf solution   Commonly known as:  SUPREP BOWEL PREP   Take 354 mLs (2 Bottles) by mouth See Admin Instructions                                pantoprazole 40 MG EC tablet   Commonly known as:  PROTONIX   TAKE 1 TABLET BY MOUTH DAILY 30 TO 60 MINUTES BEFORE A MEAL                                 PRAVASTATIN SODIUM PO   Take 40 mg by mouth daily                                Vitamin C 500 MG Caps   Take  by mouth. 1 cap daily                                VITAMIN D3 PO   Take 2,000 Units by mouth daily                                VITAMIN E   1 tab daily

## 2017-12-13 NOTE — OR NURSING
Patient and responsible adult given discharge instructions with no questions regarding instructions. Oseas score 19. Pain level 0/10.  Discharged from unit via walking. Patient discharged to home.

## 2017-12-13 NOTE — ANESTHESIA CARE TRANSFER NOTE
Patient: Farida Baldwin    Procedure(s):  COLONOSCOPY WITH POLYPECTOMY - Wound Class: II-Clean Contaminated    Diagnosis: SCREENING  Diagnosis Additional Information: No value filed.    Anesthesia Type:   MAC     Note:  Airway :Nasal Cannula  Patient transferred to:Phase II  Handoff Report: Identifed the Patient, Identified the Reponsible Provider, Reviewed the pertinent medical history, Discussed the surgical course, Reviewed Intra-OP anesthesia mangement and issues during anesthesia, Set expectations for post-procedure period and Allowed opportunity for questions and acknowledgement of understanding      Vitals: (Last set prior to Anesthesia Care Transfer)    CRNA VITALS  12/13/2017 0921 - 12/13/2017 0951      12/13/2017             Resp Rate (set): 8                Electronically Signed By: JUAN PABLO Carney CRNA  December 13, 2017  9:51 AM

## 2017-12-13 NOTE — ANESTHESIA POSTPROCEDURE EVALUATION
Patient: Farida Baldwin    Procedure(s):  COLONOSCOPY WITH POLYPECTOMY - Wound Class: II-Clean Contaminated    Diagnosis:SCREENING  Diagnosis Additional Information: No value filed.    Anesthesia Type:  MAC    Note:  Anesthesia Post Evaluation    Patient location during evaluation: Phase 2 and Bedside  Patient participation: Able to fully participate in evaluation  Level of consciousness: awake and alert  Pain management: adequate  Airway patency: patent  Cardiovascular status: acceptable  Respiratory status: acceptable  Hydration status: stable  PONV: none     Anesthetic complications: None          Last vitals:  Vitals:    12/13/17 1020 12/13/17 1025 12/13/17 1030   BP: 105/59 128/66 126/70   Resp: 16 16 16   Temp:      SpO2: 98% 100% (!) 88%         Electronically Signed By: Pa Raman MD  December 13, 2017  10:56 AM

## 2017-12-13 NOTE — DISCHARGE INSTRUCTIONS
Post-Anesthesia Patient Instructions    IMMEDIATELY FOLLOWING SURGERY:  Do not drive or operate machinery for the first twenty four hours after surgery.  Do not make any important decisions for twenty four hours after surgery or while taking narcotic pain medications or sedatives.  If you develop intractable nausea and vomiting or a severe headache please notify your doctor immediately.    FOLLOW-UP:  Please make an appointment with your surgeon as instructed. You do not need to follow up with anesthesia unless specifically instructed to do so.    WOUND CARE INSTRUCTIONS (if applicable):  Keep a dry clean dressing on the anesthesia/puncture wound site if there is drainage.  Once the wound has quit draining you may leave it open to air.  Generally you should leave the bandage intact for twenty four hours unless there is drainage.  If the epidural site drains for more than 36-48 hours please call the anesthesia department.    QUESTIONS?:  Please feel free to call your physician or the hospital  if you have any questions, and they will be happy to assist you.           INSTRUCTIONS AFTER COLONOSCOPY    WHEN YOU ARE BACK HOME:    Plan to rest for an hour or two after you get home.    You may have some cramping or pressure until you pass gas.    You may resume your regular medications.    Eat a small, light meal at first, and then gradually return to normal meal sizes.  If you had a polyp removed:    Slight bleeding may occur.  You may have a slight blood stain on the toilet paper after a bowel movement.    To lessen the chance of bleeding, avoid heavy exercise for ONE WEEK.  This includes heavy lifting, vigorous sport activities, and heavy physical labor.  You may resume your normal sexual activity.      Avoid aspirin or aspirin products if instructed by your doctor.    WHAT TO WATCH FOR:  Problems rarely occur after the exam; however, it is important for you to watch for early signs of possible  problems.  If you have     Unusual pain in your abdomen    Nausea and vomiting that persists    Excessive bleeding    Black or bloody bowel movements    Fever or temperature above 100.6 F  Please call your doctor ([facility] [phone]) or go to the nearest hospital emergency room.

## 2017-12-13 NOTE — PROCEDURES
DATE OF SERVICE:  12/13/2017      PREOPERATIVE DIAGNOSIS:  Personal history of colon polyps.      POSTOPERATIVE DIAGNOSES:  Transverse colon polyp, diverticulosis, internal hemorrhoids.      PROCEDURE:  Colonoscopy, polypectomy x1.      INDICATION:  Screening colonoscopy.      SURGEON:  Arthur Alaniz DO      DESCRIPTION OF PROCEDURE:  Farida Baldwin was brought into the endoscopy suite and placed in the left lateral decubitus position.  After a preprocedural pause and attended monitored anesthesia was administered, the external anus was inspected and was normal.  Digital rectal exam was positive for hemorrhoids.  The colonoscope was inserted and advanced with a great deal of difficulty secondary to a floppy and redundant sigmoid and transverse colon.  The cecum was able to be achieved after multiple patient repositioning and 2-person abdominal pressure.  The cecum was identified by the appendiceal orifice and the ileocecal valve.  The prep was excellent.  Upon slow withdrawal of the colonoscope, approximately 95% of the colon mucosa was directly visualized.  The cecum and ascending colon were unremarkable.  In the very proximal transverse colon, right at the hepatic flexure, a very small sessile polyp was removed using cold biting forceps.  The rest of the transverse and descending colon were unremarkable.  In the sigmoid colon, there were multiple small and medium mouth diverticula without evidence of inflammation or bleeding.  The rectum was unremarkable.  Retroflexion was positive for grade I internal hemorrhoids.  The extra air was removed from the colon and the colonoscope withdrawn.  The patient tolerated the procedure well and was taken to postanesthesia care unit.  Timing for interval colonoscopy would depend upon the histologic evaluation of the polyps.         ARTHUR ALANIZ DO             D: 12/13/2017 10:03   T: 12/13/2017 10:17   MT: [place]      Name:     FARIDA BALDWIN   MRN:      [id number]         Account:        [id number]   :      1950           Procedure Date: 2017      Document: [id number]

## 2017-12-13 NOTE — IP AVS SNAPSHOT
[facility] Operating Room    [address] [place] [id number]    Phone:  [phone]                                       After Visit Summary   12/13/2017    Farida Baldwin    MRN: [id number]           After Visit Summary Signature Page     I have received my discharge instructions, and my questions have been answered. I have discussed any challenges I see with this plan with the nurse or doctor.    ..........................................................................................................................................  Patient/Patient Representative Signature      ..........................................................................................................................................  Patient Representative Print Name and Relationship to Patient    ..................................................               ................................................  Date                                            Time    ..........................................................................................................................................  Reviewed by Signature/Title    ...................................................              ..............................................  Date                                                            Time

## 2017-12-13 NOTE — ANESTHESIA PREPROCEDURE EVALUATION
Anesthesia Evaluation     . Pt has had prior anesthetic.            ROS/MED HX    ENT/Pulmonary:     (+)tobacco use (Quit 1989), Past use , . .    Neurologic:  - neg neurologic ROS     Cardiovascular:     (+) Dyslipidemia, ----. : . . . :. .       METS/Exercise Tolerance:     Hematologic:  - neg hematologic  ROS       Musculoskeletal:   (+) arthritis, , , -       GI/Hepatic:     (+) GERD bowel prep, Other GI/Hepatic s/p Lap-Assisted Resection of Right Colon CA  2012      Renal/Genitourinary:     (+) chronic renal disease (Stage 3 (moderate)), type: CRI,       Endo:  - neg endo ROS       Psychiatric:  - neg psychiatric ROS       Infectious Disease:  - neg infectious disease ROS       Malignancy:   (+) Malignancy History of GI  GI CA  Remission status post Surgery,         Other:    - neg other ROS                 Physical Exam  Normal systems: dental    Airway   Mallampati: II  TM distance: >3 FB  Neck ROM: full    Dental     Cardiovascular   Rhythm and rate: regular and normal      Pulmonary    breath sounds clear to auscultation                    Anesthesia Plan      History & Physical Review  History and physical reviewed and following examination; no interval change.    ASA Status:  3 .        Plan for MAC with Intravenous and Propofol induction. Maintenance will be TIVA.  Reason for MAC:  Other - see comments  PONV prophylaxis:  Ondansetron (or other 5HT-3)  Surgeon requests deep sedation. Patient is an ASA 3. Will provide MAC.      Postoperative Care  Postoperative pain management:  IV analgesics.      Consents  Anesthetic plan, risks, benefits and alternatives discussed with:  Patient..                          .

## 2017-12-13 NOTE — BRIEF OP NOTE
[facility] - Brief Operative Note    Pre-operative diagnosis: Personal history of colon polyps   Post-operative diagnosis Transverse colon polyp, diverticulosis, internal hemorrhoids   Procedure: Colonoscopy, polypectomy x 1   Surgeon: Arthur Alaniz DO   Anesthesia: Monitor Anesthesia Care    Estimated blood loss: 0   Blood transfusion: No transfusion was given during surgery   Drains: 0   Specimens: Transverse colon polyp   Findings: Small sessile polyp at hepatic flexure, sigmoid diverticulosis   Complications: None   Condition: Stable   Comments: Details included in dictated operative note.

## 2017-12-14 LAB — COPATH REPORT: NORMAL

## 2017-12-19 ENCOUNTER — TELEPHONE (OUTPATIENT)
Dept: SURGERY | Facility: OTHER | Age: 67
End: 2017-12-19

## 2017-12-19 NOTE — TELEPHONE ENCOUNTER
Patient returned Stephanie's call to get the results from her colonoscopy with Dr Alaniz. Patient can be reached at [phone], thanks.

## 2017-12-19 NOTE — TELEPHONE ENCOUNTER
Returned patients call, notified her of results and that she will need follow up colonoscopy in 5 years. Patient verbalized understanding. Estefanía Frances LPN

## 2018-02-21 ENCOUNTER — OFFICE VISIT (OUTPATIENT)
Dept: FAMILY MEDICINE | Facility: OTHER | Age: 68
End: 2018-02-21
Attending: NURSE PRACTITIONER
Payer: COMMERCIAL

## 2018-02-21 VITALS
TEMPERATURE: 99.3 F | RESPIRATION RATE: 14 BRPM | BODY MASS INDEX: 23.73 KG/M2 | SYSTOLIC BLOOD PRESSURE: 112 MMHG | HEART RATE: 81 BPM | WEIGHT: 147 LBS | OXYGEN SATURATION: 97 % | DIASTOLIC BLOOD PRESSURE: 68 MMHG

## 2018-02-21 DIAGNOSIS — Z12.31 ENCOUNTER FOR SCREENING MAMMOGRAM FOR BREAST CANCER: ICD-10-CM

## 2018-02-21 DIAGNOSIS — R50.9 FEVER, UNSPECIFIED FEVER CAUSE: Primary | ICD-10-CM

## 2018-02-21 DIAGNOSIS — L98.9 SKIN LESION: ICD-10-CM

## 2018-02-21 LAB
DEPRECATED S PYO AG THROAT QL EIA: NORMAL
FLUAV+FLUBV AG SPEC QL: NEGATIVE
FLUAV+FLUBV AG SPEC QL: NEGATIVE
SPECIMEN SOURCE: NORMAL
SPECIMEN SOURCE: NORMAL

## 2018-02-21 PROCEDURE — 87804 INFLUENZA ASSAY W/OPTIC: CPT | Performed by: NURSE PRACTITIONER

## 2018-02-21 PROCEDURE — 99214 OFFICE O/P EST MOD 30 MIN: CPT | Performed by: NURSE PRACTITIONER

## 2018-02-21 PROCEDURE — 87880 STREP A ASSAY W/OPTIC: CPT | Performed by: NURSE PRACTITIONER

## 2018-02-21 PROCEDURE — 87081 CULTURE SCREEN ONLY: CPT | Performed by: NURSE PRACTITIONER

## 2018-02-21 ASSESSMENT — ANXIETY QUESTIONNAIRES
1. FEELING NERVOUS, ANXIOUS, OR ON EDGE: NOT AT ALL
7. FEELING AFRAID AS IF SOMETHING AWFUL MIGHT HAPPEN: NOT AT ALL
GAD7 TOTAL SCORE: 0
5. BEING SO RESTLESS THAT IT IS HARD TO SIT STILL: NOT AT ALL
2. NOT BEING ABLE TO STOP OR CONTROL WORRYING: NOT AT ALL
3. WORRYING TOO MUCH ABOUT DIFFERENT THINGS: NOT AT ALL
6. BECOMING EASILY ANNOYED OR IRRITABLE: NOT AT ALL

## 2018-02-21 ASSESSMENT — PATIENT HEALTH QUESTIONNAIRE - PHQ9: 5. POOR APPETITE OR OVEREATING: NOT AT ALL

## 2018-02-21 NOTE — PATIENT INSTRUCTIONS
Results for orders placed or performed in visit on 02/21/18 (from the past 24 hour(s))   Influenza A/B antigen   Result Value Ref Range    Influenza A/B Agn Specimen Nares     Influenza A Negative NEG^Negative    Influenza B Negative NEG^Negative           ASSESSMENT/PLAN:     1. Fever, unspecified fever cause  - Influenza A/B antigen - negative  - Rapid strep screen - negative      Fluids  Rest  Humidity at home - add bacteriostatic solution to humidifier  OTC Mucinex liquid cough and cold  If cough develops  Add Zicam or other zinc daily until you feel better  Please go to the ER or UC if your symptoms worsen   Please return to clinic if unimproved        2. Encounter for screening mammogram for breast cancer  - MA Screening Digital Bilateral ([facility]); Future      3. Skin lesion  - DERMATOLOGY REFERRAL ordered, they will contact you        Irma Buchanan NP  [facility]

## 2018-02-21 NOTE — PROGRESS NOTES
"  SUBJECTIVE:   Farida Baldwin is a 67 year old female who presents to clinic today for the following health issues:    Patient presents with history of having a fever >102 F two weeks ago which lasted 3 days without any other symptoms other than \"being tired\". That fever resolved and she now has a fever of tightness in her chest and a non-productive \"dry cough\" which started 2 days ago and a temp of 102 F yesterday. Also reports general ache and tiredness. Pt denies having a influenza vaccine this season. See below for further details.   See below for further complaints.           Concern - right upper anterior tibial - non healing lesion - 8 mm - irregular border, raised -  for 1 year, bleeds, and is irritated.    Onset: Spot has been there for years and now for the last few months has been scabbed    Description:   Open/scabbed area on leg    Intensity: mild    Progression of Symptoms:  same    Accompanying Signs & Symptoms:  none    Previous history of similar problem:   none    Precipitating factors:   Worsened by: none    Alleviating factors:  Improved by: none          Acute Illness   Acute illness concerns: fever, chest pressure and cough  Onset: Monday    Fever: YES    Chills/Sweats: YES    Headache (location?): YES    Sinus Pressure:no    Conjunctivitis:  no    Ear Pain: no    Rhinorrhea: no     Congestion: YES- and chest tightness    Sore Throat: no      Cough: YES - dry non-productive    Wheeze: no    Decreased Appetite: YES- nothing tastes good    Nausea: no     Vomiting: no     Diarrhea:  no     Dysuria/Freq.: no     Fatigue/Achiness: YES    Sick/Strep Exposure: no     Sneezing: No     Therapies Tried and outcome: advil cold and sinus          Problem list and histories reviewed & adjusted, as indicated.  Additional history: as documented    Patient Active Problem List   Diagnosis     Hyperlipidemia LDL goal <100     Advanced care planning/counseling discussion     Osteopenia     Gastroesophageal " reflux disease with esophagitis     History of colonic polyps - tubular adenoma - colonoscopy every 2 years     Taking a statin medication     Past Surgical History:   Procedure Laterality Date     APPENDECTOMY  1956     BIOPSY      polypectomy-benign     COLONOSCOPY  5/17/2012     COLONOSCOPY  5/30/2013    Procedure: COLONOSCOPY;  COLONOSCOPY WITH BIOPSIES;  Surgeon: Tana Benjamin MD;  Location: [facility]     COLONOSCOPY  11/12/2013    Procedure: COLONOSCOPY;  WHOLE COLON COLONOSCOPY WITH POLYPECTOMY;  Surgeon: Tana Benjamin MD;  Location: [facility]     COLONOSCOPY N/A 11/23/2015    Procedure: COLONOSCOPY;  Surgeon: Tana Benjamin MD;  Location: [facility]     COLONOSCOPY N/A 12/13/2017    Procedure: COLONOSCOPY;  COLONOSCOPY WITH POLYPECTOMY;  Surgeon: Arthur Alaniz DO;  Location: [facility]     ESOPHAGOSCOPY, GASTROSCOPY, DUODENOSCOPY (EGD), COMBINED N/A 2/12/2016    Procedure: COMBINED ESOPHAGOSCOPY, GASTROSCOPY, DUODENOSCOPY (EGD);  Surgeon: Arthur Alaniz DO;  Location: [facility]     GYN SURGERY      complete hyst     Laproscopic-assisted resection of right colon neoplasm  2012     TONSILLECTOMY  1955       Social History   Substance Use Topics     Smoking status: Former Smoker     Packs/day: 1.00     Years: 25.00     Types: Cigarettes     Smokeless tobacco: Never Used      Comment: year quit 1989     Alcohol use Yes      Comment: 1 beer daily. last drink, last night.     Family History   Problem Relation Age of Onset     Other - See Comments Father      (82 Diagnosis); cause of death     C.A.D. Father      CANCER Mother 38     pancreatic         Current Outpatient Prescriptions   Medication Sig Dispense Refill     PRAVASTATIN SODIUM PO Take 40 mg by mouth daily       pantoprazole (PROTONIX) 40 MG EC tablet TAKE 1 TABLET BY MOUTH DAILY 30 TO 60 MINUTES BEFORE A MEAL 90 tablet 2     Cholecalciferol (VITAMIN D3 PO) Take 2,000 Units by mouth daily        calcium-vitamin D (CALTRATE) 600-400 MG-UNIT per tablet  Take 1 tablet by mouth daily        aspirin 81 MG EC tablet Take 1 tablet (81 mg) by mouth daily (Patient taking differently: Take 81 mg by mouth three times a week ) 90 tablet 3     Multiple Vitamins-Iron (MULTIVITAMIN/IRON PO) Take by mouth daily 1 daily       VITAMIN E 1 tab daily       Ascorbic Acid (VITAMIN C) 500 MG CAPS Take  by mouth. 1 cap daily       Allergies   Allergen Reactions     Fosamax [Alendronic Acid] Cramps     Muscle cramps     Recent Labs   Lab Test  05/10/17   0955  05/04/16   0941  04/29/15   1036   03/27/13   1030   LDL  77  89  80   < >  93   HDL  74  68  67   < >  55   TRIG  114  152*  116   < >  163*   ALT  31  39  40   < >  45   CR  0.90   --    --    --    --    GFRESTIMATED  62   --    --    --    --    GFRESTBLACK  75   --    --    --    --    POTASSIUM  3.7   --    --    --    --    TSH  0.97   --    --    --   1.26    < > = values in this interval not displayed.      BP Readings from Last 3 Encounters:   02/21/18 112/68   12/13/17 126/70   11/21/17 115/75    Wt Readings from Last 3 Encounters:   02/21/18 147 lb (66.7 kg)   12/13/17 144 lb (65.3 kg)   11/21/17 145 lb (65.8 kg)                  Labs reviewed in EPIC    Reviewed and updated as needed this visit by clinical staff  Tobacco  Allergies  Meds       Reviewed and updated as needed this visit by Provider         ROS:  Constitutional, HEENT, cardiovascular, pulmonary, gi and gu systems are negative, except as otherwise noted.    OBJECTIVE:     /68 (BP Location: Right arm, Patient Position: Sitting, Cuff Size: Adult Regular)  Pulse 81  Temp 99.3  F (37.4  C) (Tympanic)  Resp 14  Wt 147 lb (66.7 kg)  SpO2 97%  BMI 23.73 kg/m2  Body mass index is 23.73 kg/(m^2).     GENERAL: healthy, alert and no distress  EYES: Eyes grossly normal to inspection, PERRL and conjunctivae and sclerae normal  HENT: throat irritation, dryness, mild erythema  NECK: no adenopathy, no asymmetry, masses, or scars and thyroid normal to  palpation  RESP: lungs clear to auscultation - no rales, rhonchi or wheezes  CV: regular rate and rhythm, normal S1 S2, no S3 or S4, no murmur, click or rub, no peripheral edema and peripheral pulses strong  ABDOMEN: soft, nontender, no hepatosplenomegaly, no masses and bowel sounds normal  MS: no gross musculoskeletal defects noted, no edema  SKIN - non healing skin lesion - right anterior tibia - bleeds, scabs, irregular, raised, 8 mm        Diagnostic Test Results:  Results for orders placed or performed in visit on 02/21/18 (from the past 24 hour(s))   Influenza A/B antigen   Result Value Ref Range    Influenza A/B Agn Specimen Nares     Influenza A Negative NEG^Negative    Influenza B Negative NEG^Negative         ASSESSMENT/PLAN:     1. Fever, unspecified fever cause  - Influenza A/B antigen - negative  - Rapid strep screen - negative      Fluids  Rest  Humidity at home - add bacteriostatic solution to humidifier  OTC Mucinex liquid cough and cold  If cough develops  Add Zicam or other zinc daily until you feel better  Please go to the ER or UC if your symptoms worsen   Please return to clinic if unimproved        2. Encounter for screening mammogram for breast cancer  - MA Screening Digital Bilateral ([facility]); Future      3. Skin lesion  - DERMATOLOGY REFERRAL ordered, they will contact you        Irma Buchanan NP  [facility]

## 2018-02-21 NOTE — LETTER
[facility]  [address]  [facility] [address]  Phone: [phone]    February 21, 2018        Farida Baldwin  [address] [facility] [address]          To whom it may concern:     RE: Farida Baldwin    Thank you for seeing the above patient for derm consultation.  Please see attached note.    Please contact me for questions or concerns.      Sincerely,        Irma Buchanan NP

## 2018-02-21 NOTE — NURSING NOTE
"Chief Complaint   Patient presents with     Cough       Initial /68 (BP Location: Right arm, Patient Position: Sitting, Cuff Size: Adult Regular)  Pulse 81  Temp 99.3  F (37.4  C) (Tympanic)  Resp 14  Wt 147 lb (66.7 kg)  SpO2 97%  BMI 23.73 kg/m2 Estimated body mass index is 23.73 kg/(m^2) as calculated from the following:    Height as of 11/21/17: 5' 6\" (1.676 m).    Weight as of this encounter: 147 lb (66.7 kg).  Medication Reconciliation: complete   Abi Jernigan      "

## 2018-02-21 NOTE — MR AVS SNAPSHOT
After Visit Summary   2/21/2018    Farida Baldwin    MRN: [id number]           Patient Information     Date Of Birth          1950        Visit Information        Provider Department      2/21/2018 11:15 AM Irma Buchanan NP [facility]        Today's Diagnoses     Fever, unspecified fever cause    -  1    Encounter for screening mammogram for breast cancer        Skin lesion          Care Instructions    Results for orders placed or performed in visit on 02/21/18 (from the past 24 hour(s))   Influenza A/B antigen   Result Value Ref Range    Influenza A/B Agn Specimen Nares     Influenza A Negative NEG^Negative    Influenza B Negative NEG^Negative           ASSESSMENT/PLAN:     1. Fever, unspecified fever cause  - Influenza A/B antigen - negative  - Rapid strep screen - negative      Fluids  Rest  Humidity at home - add bacteriostatic solution to humidifier  OTC Mucinex liquid cough and cold  If cough develops  Add Zicam or other zinc daily until you feel better  Please go to the ER or UC if your symptoms worsen   Please return to clinic if unimproved        2. Encounter for screening mammogram for breast cancer  - MA Screening Digital Bilateral ([facility]); Future      3. Skin lesion  - DERMATOLOGY REFERRAL ordered, they will contact you        Irma Buchanan NP  [facility]              Follow-ups after your visit        Additional Services     DERMATOLOGY REFERRAL       Your provider has referred you to: Adult - Derm - [facility]    Concern - right upper anterior tibial - non healing lesion - 8 mm - irregular border, raised -  for 1 year, bleeds, and is irritated.    Please be aware that coverage of these services is subject to the terms and limitations of your health insurance plan.  Call member services at your health plan with any benefit or coverage questions.      Please bring the following with you to your appointment:    (1) Any X-Rays, CTs or MRIs  "which have been performed.  Contact the facility where they were done to arrange for  prior to your scheduled appointment.    (2) List of current medications  (3) This referral request   (4) Any documents/labs given to you for this referral                  Future tests that were ordered for you today     Open Future Orders        Priority Expected Expires Ordered    MA Screening Digital Bilateral ([facility]) Routine  2019            Who to contact     If you have questions or need follow up information about today's clinic visit or your schedule please contact [facility] directly at [phone].  Normal or non-critical lab and imaging results will be communicated to you by [employer], letter or phone within 4 business days after the clinic has received the results. If you do not hear from us within 7 days, please contact the clinic through [employer] or phone. If you have a critical or abnormal lab result, we will notify you by phone as soon as possible.  Submit refill requests through [employer] or call your pharmacy and they will forward the refill request to us. Please allow 3 business days for your refill to be completed.          Additional Information About Your Visit        MyChart Information     [employer] lets you send messages to your doctor, view your test results, renew your prescriptions, schedule appointments and more. To sign up, go to [email or web address] . Click on \"Log in\" on the left side of the screen, which will take you to the Welcome page. Then click on \"Sign up Now\" on the right side of the page.     You will be asked to enter the access code listed below, as well as some personal information. Please follow the directions to create your username and password.     Your access code is: [email or web address]  Expires: 2018 12:37 PM     Your access code will  in 90 days. If you need help or a new code, please call your [facility] or [phone].      "   Care EveryWhere ID     This is your Care EveryWhere ID. This could be used by other organizations to access your [place] medical records  [id number]        Your Vitals Were     Pulse Temperature Respirations Pulse Oximetry BMI (Body Mass Index)       81 99.3  F (37.4  C) (Tympanic) 14 97% 23.73 kg/m2        Blood Pressure from Last 3 Encounters:   02/21/18 112/68   12/13/17 126/70   11/21/17 115/75    Weight from Last 3 Encounters:   02/21/18 147 lb (66.7 kg)   12/13/17 144 lb (65.3 kg)   11/21/17 145 lb (65.8 kg)              We Performed the Following     Beta strep group A culture     DERMATOLOGY REFERRAL     Influenza A/B antigen     Rapid strep screen          Today's Medication Changes          These changes are accurate as of 2/21/18 12:37 PM.  If you have any questions, ask your nurse or doctor.               These medicines have changed or have updated prescriptions.        Dose/Directions    aspirin 81 MG EC tablet   This may have changed:  when to take this   Used for:  Hyperlipidemia LDL goal < 100        Dose:  81 mg   Take 1 tablet (81 mg) by mouth daily   Quantity:  90 tablet   Refills:  3                Primary Care Provider Office Phone # Fax #    Irma JAMES Buchanan [phone] [address] [place]  [facility] [address]        Equal Access to Services     LILA CONRAD AH: Meghna ward Somadeline, waaxda luqadaha, qaybta kaalmada dana, carolynn bermeo. So [facility] [phone].    ATENCIÓN: Si habla español, tiene a valle disposición servicios gratuitos de asistencia lingüística. [place] al [phone].    We comply with applicable federal civil rights laws and [place] laws. We do not discriminate on the basis of race, color, national origin, age, disability, sex, sexual orientation, or gender identity.            Thank you!     Thank you for choosing [facility]  for your care. Our goal is always to provide you with excellent care.  Hearing back from our patients is one way we can continue to improve our services. Please take a few minutes to complete the written survey that you may receive in the mail after your visit with us. Thank you!             Your Updated Medication List - Protect others around you: Learn how to safely use, store and throw away your medicines at www.disposemymeds.org.          This list is accurate as of 2/21/18 12:37 PM.  Always use your most recent med list.                   Brand Name Dispense Instructions for use Diagnosis    aspirin 81 MG EC tablet     90 tablet    Take 1 tablet (81 mg) by mouth daily    Hyperlipidemia LDL goal < 100       calcium-vitamin D 600-400 MG-UNIT per tablet    CALTRATE     Take 1 tablet by mouth daily        MULTIVITAMIN/IRON PO      Take by mouth daily 1 daily        pantoprazole 40 MG EC tablet    PROTONIX    90 tablet    TAKE 1 TABLET BY MOUTH DAILY 30 TO 60 MINUTES BEFORE A MEAL    Gastroesophageal reflux disease with esophagitis       PRAVASTATIN SODIUM PO      Take 40 mg by mouth daily        Vitamin C 500 MG Caps      Take  by mouth. 1 cap daily        VITAMIN D3 PO      Take 2,000 Units by mouth daily        VITAMIN E      1 tab daily

## 2018-02-22 ASSESSMENT — PATIENT HEALTH QUESTIONNAIRE - PHQ9: SUM OF ALL RESPONSES TO PHQ QUESTIONS 1-9: 1

## 2018-02-22 ASSESSMENT — ANXIETY QUESTIONNAIRES: GAD7 TOTAL SCORE: 0

## 2018-02-23 LAB
BACTERIA SPEC CULT: NORMAL
SPECIMEN SOURCE: NORMAL

## 2018-03-02 ENCOUNTER — TRANSFERRED RECORDS (OUTPATIENT)
Dept: HEALTH INFORMATION MANAGEMENT | Facility: CLINIC | Age: 68
End: 2018-03-02

## 2018-03-08 DIAGNOSIS — E78.5 HYPERLIPIDEMIA LDL GOAL <100: Primary | ICD-10-CM

## 2018-03-08 RX ORDER — PRAVASTATIN SODIUM 40 MG
40 TABLET ORAL DAILY
Qty: 30 TABLET | Refills: 5 | Status: SHIPPED | OUTPATIENT
Start: 2018-03-08 | End: 2018-03-22 | Stop reason: DRUGHIGH

## 2018-03-08 NOTE — TELEPHONE ENCOUNTER
Pravastatin - never filled in Epic. Please advise. PCP Irma Buchanan  Last visit: 2.21.18    Recent Labs   Lab Test  05/10/17   0955  05/04/16   0941  04/29/15   1036  04/16/14   0953   CHOL  174  187  170  184   HDL  74  68  67  65*   LDL  77  89  80  94   TRIG  114  152*  116  126   CHOLHDLRATIO   --    --   2.5  2.8*

## 2018-03-26 RX ORDER — PRAVASTATIN SODIUM 20 MG
20 TABLET ORAL DAILY
Qty: 90 TABLET | Refills: 1 | COMMUNITY
Start: 2018-03-26 | End: 2018-09-24

## 2018-05-14 ENCOUNTER — HEALTH MAINTENANCE LETTER (OUTPATIENT)
Age: 68
End: 2018-05-14

## 2018-05-16 ENCOUNTER — OFFICE VISIT (OUTPATIENT)
Dept: FAMILY MEDICINE | Facility: OTHER | Age: 68
End: 2018-05-16
Attending: NURSE PRACTITIONER
Payer: COMMERCIAL

## 2018-05-16 VITALS
SYSTOLIC BLOOD PRESSURE: 118 MMHG | BODY MASS INDEX: 23.95 KG/M2 | DIASTOLIC BLOOD PRESSURE: 72 MMHG | OXYGEN SATURATION: 99 % | HEART RATE: 67 BPM | RESPIRATION RATE: 14 BRPM | HEIGHT: 66 IN | WEIGHT: 149 LBS | TEMPERATURE: 98.1 F

## 2018-05-16 DIAGNOSIS — E78.5 HYPERLIPIDEMIA LDL GOAL <100: ICD-10-CM

## 2018-05-16 DIAGNOSIS — Z79.899 TAKING A STATIN MEDICATION: ICD-10-CM

## 2018-05-16 DIAGNOSIS — Z00.00 ROUTINE GENERAL MEDICAL EXAMINATION AT A HEALTH CARE FACILITY: Primary | ICD-10-CM

## 2018-05-16 LAB
ALBUMIN SERPL-MCNC: 4.3 G/DL (ref 3.4–5)
ALBUMIN UR-MCNC: NEGATIVE MG/DL
ALP SERPL-CCNC: 53 U/L (ref 40–150)
ALT SERPL W P-5'-P-CCNC: 51 U/L (ref 0–50)
ANION GAP SERPL CALCULATED.3IONS-SCNC: 10 MMOL/L (ref 3–14)
APPEARANCE UR: CLEAR
AST SERPL W P-5'-P-CCNC: 39 U/L (ref 0–45)
BILIRUB DIRECT SERPL-MCNC: 0.3 MG/DL (ref 0–0.2)
BILIRUB SERPL-MCNC: 1.3 MG/DL (ref 0.2–1.3)
BILIRUB UR QL STRIP: NEGATIVE
BUN SERPL-MCNC: 16 MG/DL (ref 7–30)
CALCIUM SERPL-MCNC: 9.4 MG/DL (ref 8.5–10.1)
CHLORIDE SERPL-SCNC: 104 MMOL/L (ref 94–109)
CHOLEST SERPL-MCNC: 177 MG/DL
CO2 SERPL-SCNC: 27 MMOL/L (ref 20–32)
COLOR UR AUTO: YELLOW
CREAT SERPL-MCNC: 0.9 MG/DL (ref 0.52–1.04)
GFR SERPL CREATININE-BSD FRML MDRD: 62 ML/MIN/1.7M2
GLUCOSE SERPL-MCNC: 93 MG/DL (ref 70–99)
GLUCOSE UR STRIP-MCNC: NEGATIVE MG/DL
HDLC SERPL-MCNC: 66 MG/DL
HGB UR QL STRIP: NEGATIVE
KETONES UR STRIP-MCNC: NEGATIVE MG/DL
LDLC SERPL CALC-MCNC: 81 MG/DL
LEUKOCYTE ESTERASE UR QL STRIP: ABNORMAL
NITRATE UR QL: NEGATIVE
NONHDLC SERPL-MCNC: 111 MG/DL
PH UR STRIP: 6 PH (ref 5–7)
POTASSIUM SERPL-SCNC: 3.9 MMOL/L (ref 3.4–5.3)
PROT SERPL-MCNC: 7.7 G/DL (ref 6.8–8.8)
RBC #/AREA URNS AUTO: NORMAL /HPF
SODIUM SERPL-SCNC: 141 MMOL/L (ref 133–144)
SOURCE: ABNORMAL
SP GR UR STRIP: 1.02 (ref 1–1.03)
TRIGL SERPL-MCNC: 151 MG/DL
TSH SERPL DL<=0.005 MIU/L-ACNC: 1.12 MU/L (ref 0.4–4)
UROBILINOGEN UR STRIP-ACNC: 0.2 EU/DL (ref 0.2–1)
WBC #/AREA URNS AUTO: NORMAL /HPF

## 2018-05-16 PROCEDURE — 80048 BASIC METABOLIC PNL TOTAL CA: CPT | Mod: ZL | Performed by: NURSE PRACTITIONER

## 2018-05-16 PROCEDURE — 84443 ASSAY THYROID STIM HORMONE: CPT | Mod: ZL | Performed by: NURSE PRACTITIONER

## 2018-05-16 PROCEDURE — 80076 HEPATIC FUNCTION PANEL: CPT | Mod: ZL | Performed by: NURSE PRACTITIONER

## 2018-05-16 PROCEDURE — 99397 PER PM REEVAL EST PAT 65+ YR: CPT | Performed by: NURSE PRACTITIONER

## 2018-05-16 PROCEDURE — 81001 URINALYSIS AUTO W/SCOPE: CPT | Mod: ZL | Performed by: NURSE PRACTITIONER

## 2018-05-16 PROCEDURE — 36415 COLL VENOUS BLD VENIPUNCTURE: CPT | Mod: ZL | Performed by: NURSE PRACTITIONER

## 2018-05-16 PROCEDURE — 80061 LIPID PANEL: CPT | Mod: ZL | Performed by: NURSE PRACTITIONER

## 2018-05-16 ASSESSMENT — PAIN SCALES - GENERAL: PAINLEVEL: NO PAIN (0)

## 2018-05-16 ASSESSMENT — ANXIETY QUESTIONNAIRES
1. FEELING NERVOUS, ANXIOUS, OR ON EDGE: NOT AT ALL
2. NOT BEING ABLE TO STOP OR CONTROL WORRYING: NOT AT ALL
GAD7 TOTAL SCORE: 0
5. BEING SO RESTLESS THAT IT IS HARD TO SIT STILL: NOT AT ALL
6. BECOMING EASILY ANNOYED OR IRRITABLE: NOT AT ALL
7. FEELING AFRAID AS IF SOMETHING AWFUL MIGHT HAPPEN: NOT AT ALL
3. WORRYING TOO MUCH ABOUT DIFFERENT THINGS: NOT AT ALL

## 2018-05-16 ASSESSMENT — PATIENT HEALTH QUESTIONNAIRE - PHQ9: 5. POOR APPETITE OR OVEREATING: NOT AT ALL

## 2018-05-16 NOTE — PROGRESS NOTES
SUBJECTIVE:   CC: Farida Baldwin is an 67 year old woman who presents for preventive health visit.         Healthy Habits:    Do you get at least three servings of calcium containing foods daily (dairy, green leafy vegetables, etc.)? yes    Amount of exercise or daily activities, outside of work: walking and golf    Problems taking medications regularly No    Medication side effects: No    Have you had an eye exam in the past two years? yes    Do you see a dentist twice per year? yes    Do you have sleep apnea, excessive snoring or daytime drowsiness?no      Hyperlipidemia Follow-Up    Rate your low fat/cholesterol diet?: fair    Taking statin?  Yes, no muscle aches from statin    Other lipid medications/supplements?:  None        PHQ-9 SCORE 5/10/2017 2/21/2018 5/16/2018   Total Score - - -   Total Score 0 1 0     JENNIFER-7 SCORE 5/10/2017 2/21/2018 5/16/2018   Total Score 1 0 0       Abuse: Current or Past(Physical, Sexual or Emotional)- No  Do you feel safe in your environment - Yes    Social History   Substance Use Topics     Smoking status: Former Smoker     Packs/day: 1.00     Years: 25.00     Types: Cigarettes     Smokeless tobacco: Never Used      Comment: year quit 1989     Alcohol use Yes      Comment: 1 beer daily. last drink, last night.     If you drink alcohol do you typically have >3 drinks per day or >7 drinks per week? No                     Reviewed orders with patient.  Reviewed health maintenance and updated orders accordingly - Yes  Labs reviewed in EPIC  BP Readings from Last 3 Encounters:   05/16/18 118/72   02/21/18 112/68   12/13/17 126/70    Wt Readings from Last 3 Encounters:   05/16/18 149 lb (67.6 kg)   02/21/18 147 lb (66.7 kg)   12/13/17 144 lb (65.3 kg)                  Patient Active Problem List   Diagnosis     Hyperlipidemia LDL goal <100     Advanced care planning/counseling discussion     Osteopenia     Gastroesophageal reflux disease with esophagitis     History of colonic  polyps - tubular adenoma - colonoscopy every 2 years     Taking a statin medication     Past Surgical History:   Procedure Laterality Date     APPENDECTOMY  1956     BIOPSY      polypectomy-benign     COLONOSCOPY  5/17/2012     COLONOSCOPY  5/30/2013    Procedure: COLONOSCOPY;  COLONOSCOPY WITH BIOPSIES;  Surgeon: Tana Benjamin MD;  [facility]     COLONOSCOPY  11/12/2013    Procedure: COLONOSCOPY;  WHOLE COLON COLONOSCOPY WITH POLYPECTOMY;  Surgeon: Tana Benjamin MD;  [facility]     COLONOSCOPY N/A 11/23/2015    Procedure: COLONOSCOPY;  Surgeon: Tana Benjamin MD;  [facility]     COLONOSCOPY N/A 12/13/2017    Procedure: COLONOSCOPY;  COLONOSCOPY WITH POLYPECTOMY;  Surgeon: Arthru Alaniz DO;  [facility]     ESOPHAGOSCOPY, GASTROSCOPY, DUODENOSCOPY (EGD), COMBINED N/A 2/12/2016    Procedure: COMBINED ESOPHAGOSCOPY, GASTROSCOPY, DUODENOSCOPY (EGD);  Surgeon: Arthur Alaniz DO;  [facility]     GYN SURGERY      complete hyst     Laproscopic-assisted resection of right colon neoplasm  2012     TONSILLECTOMY  1955       Social History   Substance Use Topics     Smoking status: Former Smoker     Packs/day: 1.00     Years: 25.00     Types: Cigarettes     Smokeless tobacco: Never Used      Comment: year quit 1989     Alcohol use Yes      Comment: 1 beer daily. last drink, last night.     Family History   Problem Relation Age of Onset     Other - See Comments Father      (82 Diagnosis); cause of death     C.A.D. Father      CANCER Mother 38     pancreatic         Current Outpatient Prescriptions   Medication Sig Dispense Refill     Ascorbic Acid (VITAMIN C) 500 MG CAPS Take  by mouth. 1 cap daily       aspirin 81 MG EC tablet Take 1 tablet (81 mg) by mouth daily (Patient taking differently: Take 81 mg by mouth three times a week ) 90 tablet 3     calcium-vitamin D (CALTRATE) 600-400 MG-UNIT per tablet Take 1 tablet by mouth daily        Cholecalciferol (VITAMIN D3 PO) Take 2,000  Units by mouth daily        Multiple Vitamins-Iron (MULTIVITAMIN/IRON PO) Take by mouth daily 1 daily       pantoprazole (PROTONIX) 40 MG EC tablet TAKE 1 TABLET BY MOUTH DAILY 30 TO 60 MINUTES BEFORE A MEAL 90 tablet 2     pravastatin (PRAVACHOL) 20 MG tablet Take 1 tablet (20 mg) by mouth daily 90 tablet 1     VITAMIN E 1 tab daily       Allergies   Allergen Reactions     Fosamax [Alendronic Acid] Cramps     Muscle cramps     Recent Labs   Lab Test  05/10/17   0955  05/04/16   0941  04/29/15   1036   03/27/13   1030   LDL  77  89  80   < >  93   HDL  74  68  67   < >  55   TRIG  114  152*  116   < >  163*   ALT  31  39  40   < >  45   CR  0.90   --    --    --    --    GFRESTIMATED  62   --    --    --    --    GFRESTBLACK  75   --    --    --    --    POTASSIUM  3.7   --    --    --    --    TSH  0.97   --    --    --   1.26    < > = values in this interval not displayed.        Mammogram is scheduled    Pertinent mammograms are reviewed under the imaging tab.  History of abnormal Pap smear: NO - age 65 - see link Cervical Cytology Screening Guidelines    Reviewed and updated as needed this visit by clinical staff  Tobacco  Allergies  Meds         Reviewed and updated as needed this visit by Provider        Past Medical History:   Diagnosis Date     Gastroesophageal reflux disease with esophagitis 1/27/2016     Hyperlipidemia LDL goal < 100 9/7/2012     Osteopenia 4/25/2014     Taking a statin medication 5/10/2017     Tubulovillous adenoma polyp of colon 9/7/2012      Past Surgical History:   Procedure Laterality Date     APPENDECTOMY  1956     BIOPSY      polypectomy-benign     COLONOSCOPY  5/17/2012     COLONOSCOPY  5/30/2013    Procedure: COLONOSCOPY;  COLONOSCOPY WITH BIOPSIES;  Surgeon: Tana Benjamin MD;  [facility]     COLONOSCOPY  11/12/2013    Procedure: COLONOSCOPY;  WHOLE COLON COLONOSCOPY WITH POLYPECTOMY;  Surgeon: Tana Benjamin MD;  [facility]     COLONOSCOPY N/A 11/23/2015     "Procedure: COLONOSCOPY;  Surgeon: Tana Benjamin MD;  [facility]     COLONOSCOPY N/A 12/13/2017    Procedure: COLONOSCOPY;  COLONOSCOPY WITH POLYPECTOMY;  Surgeon: Arthur Alaniz DO;  [facility]     ESOPHAGOSCOPY, GASTROSCOPY, DUODENOSCOPY (EGD), COMBINED N/A 2/12/2016    Procedure: COMBINED ESOPHAGOSCOPY, GASTROSCOPY, DUODENOSCOPY (EGD);  Surgeon: Arthur Alaniz DO;  [facility]     GYN SURGERY      complete hyst     Laproscopic-assisted resection of right colon neoplasm  2012     TONSILLECTOMY  1955     Obstetric History     No data available          ROS:  CONSTITUTIONAL: NEGATIVE for fever, chills, change in weight  INTEGUMENTARY/SKIN: NEGATIVE for worrisome rashes, moles or lesions  EYES: NEGATIVE for vision changes or irritation  ENT: NEGATIVE for ear, mouth and throat problems  RESP: NEGATIVE for significant cough or SOB  BREAST: NEGATIVE for masses, tenderness or discharge  CV: NEGATIVE for chest pain, palpitations or peripheral edema  GI: NEGATIVE for nausea, abdominal pain, heartburn, or change in bowel habits  : NEGATIVE for unusual urinary or vaginal symptoms. No vaginal bleeding.  MUSCULOSKELETAL: NEGATIVE for significant arthralgias or myalgia  NEURO: NEGATIVE for weakness, dizziness or paresthesias  PSYCHIATRIC: NEGATIVE for changes in mood or affect     OBJECTIVE:   /72 (BP Location: Right arm, Patient Position: Sitting, Cuff Size: Adult Regular)  Pulse 67  Temp 98.1  F (36.7  C) (Tympanic)  Resp 14  Ht 5' 5.75\" (1.67 m)  Wt 149 lb (67.6 kg)  SpO2 99%  BMI 24.23 kg/m2     EXAM:  GENERAL: healthy, alert and no distress  EYES: Eyes grossly normal to inspection, PERRL and conjunctivae and sclerae normal  HENT: ear canals and TM's normal, nose and mouth without ulcers or lesions  NECK: no adenopathy, no asymmetry, masses, or scars and thyroid normal to palpation  RESP: lungs clear to auscultation - no rales, rhonchi or wheezes  BREAST: normal without masses, " "tenderness or nipple discharge and no palpable axillary masses or adenopathy  CV: regular rate and rhythm, normal S1 S2, no S3 or S4, no murmur, click or rub, no peripheral edema and peripheral pulses strong  ABDOMEN: soft, nontender, no hepatosplenomegaly, no masses and bowel sounds normal  MS: no gross musculoskeletal defects noted, no edema  SKIN: healing right anterior tibial lesion, basal cell carcinoma removed by Dermatology - [place] [facility]  PSYCH: mentation appears normal, affect normal/bright    ASSESSMENT/PLAN:       1. Routine general medical examination at a health care facility  - UA reflex to Microscopic and Culture - [facility]  - Annual physical 1 year    2. Hyperlipidemia LDL goal <100  - Continue plan of care  - Basic metabolic panel  - Lipid Profile  - TSH with free T4 reflex    3. Taking a statin medication  - Hepatic panel        COUNSELING:   Reviewed preventive health counseling, as reflected in patient instructions       Regular exercise       Healthy diet/nutrition       Vision screening         reports that she has quit smoking. Her smoking use included Cigarettes. She has a 25.00 pack-year smoking history. She has never used smokeless tobacco.    Estimated body mass index is 24.23 kg/(m^2) as calculated from the following:    Height as of this encounter: 5' 5.75\" (1.67 m).    Weight as of this encounter: 149 lb (67.6 kg).       Counseling Resources:  ATP IV Guidelines  Pooled Cohorts Equation Calculator  Breast Cancer Risk Calculator  FRAX Risk Assessment  ICSI Preventive Guidelines  Dietary Guidelines for Americans, 2010  USDA's MyPlate  ASA Prophylaxis  Lung CA Screening    Irma Buchanan NP  [facility]  "

## 2018-05-16 NOTE — MR AVS SNAPSHOT
After Visit Summary   5/16/2018    Farida Baldwin    MRN: [id number]           Patient Information     Date Of Birth          1950        Visit Information        Provider Department      5/16/2018 10:00 AM Irma Buchanan NP [facility]        Today's Diagnoses     Routine general medical examination at a health care facility    -  1    Hyperlipidemia LDL goal <100        Taking a statin medication          Care Instructions        1. Routine general medical examination at a health care facility  - UA reflex to Microscopic and Culture - [facility]  - Annual physical 1 year    2. Hyperlipidemia LDL goal <100  - Continue plan of care  - Basic metabolic panel  - Lipid Profile  - TSH with free T4 reflex    3. Taking a statin medication  - Hepatic panel          Preventive Health Recommendations  Female Ages 65 +    Yearly exam:     See your health care provider every year in order to  o Review health changes.   o Discuss preventive care.    o Review your medicines if your doctor has prescribed any.      You no longer need a yearly Pap test unless you've had an abnormal Pap test in the past 10 years. If you have vaginal symptoms, such as bleeding or discharge, be sure to talk with your provider about a Pap test.      Every 1 to 2 years, have a mammogram.  If you are over 69, talk with your health care provider about whether or not you want to continue having screening mammograms.      Every 10 years, have a colonoscopy. Or, have a yearly FIT test (stool test). These exams will check for colon cancer.       Have a cholesterol test every 5 years, or more often if your doctor advises it.       Have a diabetes test (fasting glucose) every three years. If you are at risk for diabetes, you should have this test more often.       At age 65, have a bone density scan (DEXA) to check for osteoporosis (brittle bone disease).    Shots:    Get a flu shot each year.    Get a tetanus shot every 10  years.    Talk to your doctor about your pneumonia vaccines. There are now two you should receive - Pneumovax (PPSV 23) and Prevnar (PCV 13).    Talk to your doctor about the shingles vaccine.    Talk to your doctor about the hepatitis B vaccine.    Nutrition:     Eat at least 5 servings of fruits and vegetables each day.      Eat whole-grain bread, whole-wheat pasta and brown rice instead of white grains and rice.      Talk to your provider about Calcium and Vitamin D.     Lifestyle    Exercise at least 150 minutes a week (30 minutes a day, 5 days a week). This will help you control your weight and prevent disease.      Limit alcohol to one drink per day.      No smoking.       Wear sunscreen to prevent skin cancer.       See your dentist twice a year for an exam and cleaning.      See your eye doctor every 1 to 2 years to screen for conditions such as glaucoma, macular degeneration, cataracts, etc           Follow-ups after your visit        Your next 10 appointments already scheduled     May 30, 2018 10:30 AM CDT   (Arrive by 10:15 AM)   MA SCREENING DIGITAL BILATERAL with [facility]   [facility] ([facility] )    [address] [address]   [phone]           Do not use any powder, lotion or deodorant under your arms or on your breast. If you do, we will ask you to remove it before your exam.  Wear comfortable, two-piece clothing.  If you have any allergies, tell your care team.  Bring any previous mammograms from other facilities or have them mailed to the breast center.              Who to contact     If you have questions or need follow up information about today's clinic visit or your schedule please contact [facility] directly at [phone].  Normal or non-critical lab and imaging results will be communicated to you by MyChart, letter or phone within 4 business days after the clinic has received the results. If you do not  "hear from us within 7 days, please contact the clinic through SetMeUp or phone. If you have a critical or abnormal lab result, we will notify you by phone as soon as possible.  Submit refill requests through SetMeUp or call your pharmacy and they will forward the refill request to us. Please allow 3 business days for your refill to be completed.          Additional Information About Your Visit        SetMeUp Information     SetMeUp lets you send messages to your doctor, view your test results, renew your prescriptions, schedule appointments and more. To sign up, go to [email or web address] . Click on \"Log in\" on the left side of the screen, which will take you to the Welcome page. Then click on \"Sign up Now\" on the right side of the page.     You will be asked to enter the access code listed below, as well as some personal information. Please follow the directions to create your username and password.     Your access code is: [email or web address]  Expires: 2018  1:37 PM     Your access code will  in 90 days. If you need help or a new code, please call your [facility] clinic or [phone].        Care EveryWhere ID     This is your Care EveryWhere ID. This could be used by other organizations to access your [facility] medical records  [id number]        Your Vitals Were     Pulse Temperature Respirations Height Pulse Oximetry BMI (Body Mass Index)    67 98.1  F (36.7  C) (Tympanic) 14 5' 5.75\" (1.67 m) 99% 24.23 kg/m2       Blood Pressure from Last 3 Encounters:   18 118/72   18 112/68   17 126/70    Weight from Last 3 Encounters:   18 149 lb (67.6 kg)   18 147 lb (66.7 kg)   17 144 lb (65.3 kg)              We Performed the Following     *UA reflex to Microscopic and Culture - [place]     Basic metabolic panel     Hepatic panel     Lipid Profile     TSH with free T4 reflex          Today's Medication Changes          These changes are accurate as of 18 10:39 AM.  " If you have any questions, ask your nurse or doctor.               These medicines have changed or have updated prescriptions.        Dose/Directions    aspirin 81 MG EC tablet   This may have changed:  when to take this   Used for:  Hyperlipidemia LDL goal < 100        Dose:  81 mg   Take 1 tablet (81 mg) by mouth daily   Quantity:  90 tablet   Refills:  3                Primary Care Provider Office Phone # Fax #    Irma Buchanan [phone] [address]  [place] [address]        Equal Access to Services     [facility]: Hadii aad ku hadasho Soomaali, waaxda luqadaha, qaybta kaalmada adeegyada, waxay naomiin hayaan yaritza rogers . So [facility] [phone].    ATENCIÓN: Si habla español, tiene a valle disposición servicios gratuitos de asistencia lingüística. [facility] [phone].    We comply with applicable federal civil rights laws and [place] laws. We do not discriminate on the basis of race, color, national origin, age, disability, sex, sexual orientation, or gender identity.            Thank you!     Thank you for choosing [facility]  for your care. Our goal is always to provide you with excellent care. Hearing back from our patients is one way we can continue to improve our services. Please take a few minutes to complete the written survey that you may receive in the mail after your visit with us. Thank you!             Your Updated Medication List - Protect others around you: Learn how to safely use, store and throw away your medicines at www.disposemymeds.org.          This list is accurate as of 5/16/18 10:39 AM.  Always use your most recent med list.                   Brand Name Dispense Instructions for use Diagnosis    aspirin 81 MG EC tablet     90 tablet    Take 1 tablet (81 mg) by mouth daily    Hyperlipidemia LDL goal < 100       calcium-vitamin D 600-400 MG-UNIT per tablet    CALTRATE     Take 1 tablet by mouth daily        MULTIVITAMIN/IRON PO       Take by mouth daily 1 daily        pantoprazole 40 MG EC tablet    PROTONIX    90 tablet    TAKE 1 TABLET BY MOUTH DAILY 30 TO 60 MINUTES BEFORE A MEAL    Gastroesophageal reflux disease with esophagitis       PRAVACHOL 20 MG tablet   Generic drug:  pravastatin     90 tablet    Take 1 tablet (20 mg) by mouth daily        Vitamin C 500 MG Caps      Take  by mouth. 1 cap daily        VITAMIN D3 PO      Take 2,000 Units by mouth daily        VITAMIN E      1 tab daily

## 2018-05-16 NOTE — PATIENT INSTRUCTIONS
1. Routine general medical examination at a health care facility  - UA reflex to Microscopic and Culture - [facility]  - Annual physical 1 year    2. Hyperlipidemia LDL goal <100  - Continue plan of care  - Basic metabolic panel  - Lipid Profile  - TSH with free T4 reflex    3. Taking a statin medication  - Hepatic panel          Preventive Health Recommendations  Female Ages 65 +    Yearly exam:     See your health care provider every year in order to  o Review health changes.   o Discuss preventive care.    o Review your medicines if your doctor has prescribed any.      You no longer need a yearly Pap test unless you've had an abnormal Pap test in the past 10 years. If you have vaginal symptoms, such as bleeding or discharge, be sure to talk with your provider about a Pap test.      Every 1 to 2 years, have a mammogram.  If you are over 69, talk with your health care provider about whether or not you want to continue having screening mammograms.      Every 10 years, have a colonoscopy. Or, have a yearly FIT test (stool test). These exams will check for colon cancer.       Have a cholesterol test every 5 years, or more often if your doctor advises it.       Have a diabetes test (fasting glucose) every three years. If you are at risk for diabetes, you should have this test more often.       At age 65, have a bone density scan (DEXA) to check for osteoporosis (brittle bone disease).    Shots:    Get a flu shot each year.    Get a tetanus shot every 10 years.    Talk to your doctor about your pneumonia vaccines. There are now two you should receive - Pneumovax (PPSV 23) and Prevnar (PCV 13).    Talk to your doctor about the shingles vaccine.    Talk to your doctor about the hepatitis B vaccine.    Nutrition:     Eat at least 5 servings of fruits and vegetables each day.      Eat whole-grain bread, whole-wheat pasta and brown rice instead of white grains and rice.      Talk to your provider about Calcium and  Vitamin D.     Lifestyle    Exercise at least 150 minutes a week (30 minutes a day, 5 days a week). This will help you control your weight and prevent disease.      Limit alcohol to one drink per day.      No smoking.       Wear sunscreen to prevent skin cancer.       See your dentist twice a year for an exam and cleaning.      See your eye doctor every 1 to 2 years to screen for conditions such as glaucoma, macular degeneration, cataracts, etc

## 2018-05-16 NOTE — NURSING NOTE
"Chief Complaint   Patient presents with     Physical     Hyperlipidemia       Initial /72 (BP Location: Right arm, Patient Position: Sitting, Cuff Size: Adult Regular)  Pulse 67  Temp 98.1  F (36.7  C) (Tympanic)  Resp 14  Ht 5' 5.75\" (1.67 m)  Wt 149 lb (67.6 kg)  SpO2 99%  BMI 24.23 kg/m2 Estimated body mass index is 24.23 kg/(m^2) as calculated from the following:    Height as of this encounter: 5' 5.75\" (1.67 m).    Weight as of this encounter: 149 lb (67.6 kg).  Medication Reconciliation: complete    Abi Jernigan LPN    "

## 2018-05-17 ASSESSMENT — ANXIETY QUESTIONNAIRES: GAD7 TOTAL SCORE: 0

## 2018-05-17 ASSESSMENT — PATIENT HEALTH QUESTIONNAIRE - PHQ9: SUM OF ALL RESPONSES TO PHQ QUESTIONS 1-9: 0

## 2018-05-30 ENCOUNTER — RADIANT APPOINTMENT (OUTPATIENT)
Dept: MAMMOGRAPHY | Facility: OTHER | Age: 68
End: 2018-05-30
Attending: NURSE PRACTITIONER
Payer: COMMERCIAL

## 2018-05-30 DIAGNOSIS — Z12.31 ENCOUNTER FOR SCREENING MAMMOGRAM FOR BREAST CANCER: ICD-10-CM

## 2018-05-30 PROCEDURE — 77067 SCR MAMMO BI INCL CAD: CPT | Mod: TC

## 2018-09-24 ENCOUNTER — TELEPHONE (OUTPATIENT)
Dept: FAMILY MEDICINE | Facility: OTHER | Age: 68
End: 2018-09-24

## 2018-09-24 DIAGNOSIS — E78.5 HYPERLIPIDEMIA LDL GOAL <100: Primary | ICD-10-CM

## 2018-09-24 RX ORDER — PRAVASTATIN SODIUM 20 MG
20 TABLET ORAL DAILY
Qty: 90 TABLET | Refills: 1 | Status: SHIPPED | OUTPATIENT
Start: 2018-09-24 | End: 2019-03-21

## 2018-11-09 NOTE — PROGRESS NOTES
SUBJECTIVE:   Farida Baldwin is a 68 year old female who presents to clinic today for the following health issues:        Skin change  Onset: about 2 weeks     Description:   Location: vulvar region  Character: dark coloration/scratch in the middle of the vulvar region  Itching (Pruritis): no     Progression of Symptoms:  constant    Accompanying Signs & Symptoms:  Fever: no   Body aches or joint pain: no   Sore throat symptoms: no   Recent cold symptoms: no     History:   Previous similar rash: no     Precipitating factors:   Exposure to similar rash: no   New exposures: None   Recent travel: no     Alleviating factors:  No  Therapies Tried and outcome: n/a      Pt states she had gone to the bathroom and noticed some bright red blood on the toilet paper and decided to take a mirror to look down there to see what was going on. She described what looks like a deep scratch about a quarter of an inch long in the middle of the vulvar region. She states it does not cause any discomfort or itching. States it has been there for about 2 weeks and would like to just have it checked out.        Problem list and histories reviewed & adjusted, as indicated.  Additional history: as documented      Patient Active Problem List   Diagnosis     Hyperlipidemia LDL goal <100     Advanced care planning/counseling discussion     Osteopenia     Gastroesophageal reflux disease with esophagitis     History of colonic polyps - tubular adenoma - colonoscopy every 2 years     Taking a statin medication     Past Surgical History:   Procedure Laterality Date     APPENDECTOMY  1956     BIOPSY      polypectomy-benign     COLONOSCOPY  5/17/2012     COLONOSCOPY  5/30/2013    Procedure: COLONOSCOPY;  COLONOSCOPY WITH BIOPSIES;  Surgeon: Tana Benjamin MD;  [facility]     COLONOSCOPY  11/12/2013    Procedure: COLONOSCOPY;  WHOLE COLON COLONOSCOPY WITH POLYPECTOMY;  Surgeon: Tana Benjamin MD;  [facility]     COLONOSCOPY N/A  11/23/2015    Procedure: COLONOSCOPY;  Surgeon: Tana Benjamin MD;  [facility]     COLONOSCOPY N/A 12/13/2017    Procedure: COLONOSCOPY;  COLONOSCOPY WITH POLYPECTOMY;  Surgeon: Arthur Alaniz DO;  [facility]     ESOPHAGOSCOPY, GASTROSCOPY, DUODENOSCOPY (EGD), COMBINED N/A 2/12/2016    Procedure: COMBINED ESOPHAGOSCOPY, GASTROSCOPY, DUODENOSCOPY (EGD);  Surgeon: Arthur Alaniz DO;  [facility]     GYN SURGERY      complete hyst     Laproscopic-assisted resection of right colon neoplasm  2012     TONSILLECTOMY  1955       Social History   Substance Use Topics     Smoking status: Former Smoker     Packs/day: 1.00     Years: 25.00     Types: Cigarettes     Smokeless tobacco: Never Used      Comment: year quit 1989     Alcohol use Yes      Comment: 1 beer daily. last drink, last night.     Family History   Problem Relation Age of Onset     Other - See Comments Father      (82 Diagnosis); cause of death     C.A.D. Father      Cancer Mother 38     pancreatic         Current Outpatient Prescriptions   Medication Sig Dispense Refill     Ascorbic Acid (VITAMIN C) 500 MG CAPS Take  by mouth. 1 cap daily       aspirin 81 MG EC tablet Take 1 tablet (81 mg) by mouth daily (Patient taking differently: Take 81 mg by mouth three times a week ) 90 tablet 3     calcium-vitamin D (CALTRATE) 600-400 MG-UNIT per tablet Take 1 tablet by mouth daily        Cholecalciferol (VITAMIN D3 PO) Take 2,000 Units by mouth daily        Multiple Vitamins-Iron (MULTIVITAMIN/IRON PO) Take by mouth daily 1 daily       pravastatin (PRAVACHOL) 20 MG tablet Take 1 tablet (20 mg) by mouth daily 90 tablet 1     VITAMIN E 1 tab daily       pantoprazole (PROTONIX) 40 MG EC tablet TAKE 1 TABLET BY MOUTH DAILY 30 TO 60 MINUTES BEFORE A MEAL (Patient not taking: Reported on 11/13/2018) 90 tablet 2     Allergies   Allergen Reactions     Fosamax [Alendronic Acid] Cramps     Muscle cramps     Recent Labs   Lab Test  05/16/18   1042   "05/10/17   0955  05/04/16   0941   LDL  81  77  89   HDL  66  74  68   TRIG  151*  114  152*   ALT  51*  31  39   CR  0.90  0.90   --    GFRESTIMATED  62  62   --    GFRESTBLACK  75  75   --    POTASSIUM  3.9  3.7   --    TSH  1.12  0.97   --       BP Readings from Last 3 Encounters:   11/13/18 128/70   05/16/18 118/72   02/21/18 112/68    Wt Readings from Last 3 Encounters:   11/13/18 153 lb 3.2 oz (69.5 kg)   05/16/18 149 lb (67.6 kg)   02/21/18 147 lb (66.7 kg)                  Labs reviewed in EPIC    Reviewed and updated as needed this visit by clinical staff  Tobacco  Allergies  Meds  Med Hx  Surg Hx  Fam Hx  Soc Hx      Reviewed and updated as needed this visit by Provider         ROS:  Constitutional, HEENT, cardiovascular, pulmonary, gi and gu systems are negative, except as otherwise noted.    OBJECTIVE:     /70 (BP Location: Right arm, Patient Position: Sitting, Cuff Size: Adult Regular)  Pulse 73  Temp 97.2  F (36.2  C) (Tympanic)  Resp 18  Ht 5' 5.75\" (1.67 m)  Wt 153 lb 3.2 oz (69.5 kg)  SpO2 97%  BMI 24.92 kg/m2  Body mass index is 24.92 kg/(m^2).      11/13/18 5/16/18 2/21/18 11/21/17 5/10/17   /70 118/72 112/68 115/75 110/70   Pulse 73 67 81 73 72   Resp 18 14 14 18 14   SpO2 97 % 99 % 97 % 95 %    Height 5' 5.75\" (1.67 m) 5' 5.75\" (1.67 m)  5' 6\" (1.676 m) 5' 6\" (1.676 m)   Weight 153 lb 3.2 oz (69.5 kg) 149 lb (67.6 kg) 147 lb (66.7 kg) 145 lb (65.8 kg) 146 lb (66.2 kg)   Pain Score 0 (None) 0 (None)  0 (None)    BMI (Calculated) 24.97 24.28  23.45 23.61         GENERAL: healthy, alert and no distress  EYES: Eyes grossly normal to inspection, PERRL and conjunctivae and sclerae normal  RESP: lungs clear to auscultation - no rales, rhonchi or wheezes  CV: regular rate and rhythm, normal S1 S2, no S3 or S4, no murmur, click or rub, no peripheral edema and peripheral pulses strong   (female): No labial lesion, irritation, bleeding or abnormality.  External Urethral " irritation, no bleeding.   PSYCH: mentation appears normal, affect normal/bright        ASSESSMENT/PLAN:     1. Vaginal irritation  - Check patrice in 2 weeks, please call if not resolved, will refer to OB as needed  - Return sooner as needed        Irma Buchanan NP  [facility]

## 2018-11-13 ENCOUNTER — OFFICE VISIT (OUTPATIENT)
Dept: FAMILY MEDICINE | Facility: OTHER | Age: 68
End: 2018-11-13
Attending: NURSE PRACTITIONER
Payer: COMMERCIAL

## 2018-11-13 VITALS
RESPIRATION RATE: 18 BRPM | HEIGHT: 66 IN | WEIGHT: 153.2 LBS | SYSTOLIC BLOOD PRESSURE: 128 MMHG | HEART RATE: 73 BPM | DIASTOLIC BLOOD PRESSURE: 70 MMHG | OXYGEN SATURATION: 97 % | TEMPERATURE: 97.2 F | BODY MASS INDEX: 24.62 KG/M2

## 2018-11-13 DIAGNOSIS — N89.8 VAGINAL IRRITATION: Primary | ICD-10-CM

## 2018-11-13 PROCEDURE — 99213 OFFICE O/P EST LOW 20 MIN: CPT | Performed by: NURSE PRACTITIONER

## 2018-11-13 PROCEDURE — G0463 HOSPITAL OUTPT CLINIC VISIT: HCPCS

## 2018-11-13 ASSESSMENT — ANXIETY QUESTIONNAIRES
6. BECOMING EASILY ANNOYED OR IRRITABLE: NOT AT ALL
GAD7 TOTAL SCORE: 0
7. FEELING AFRAID AS IF SOMETHING AWFUL MIGHT HAPPEN: NOT AT ALL
3. WORRYING TOO MUCH ABOUT DIFFERENT THINGS: NOT AT ALL
IF YOU CHECKED OFF ANY PROBLEMS ON THIS QUESTIONNAIRE, HOW DIFFICULT HAVE THESE PROBLEMS MADE IT FOR YOU TO DO YOUR WORK, TAKE CARE OF THINGS AT HOME, OR GET ALONG WITH OTHER PEOPLE: NOT DIFFICULT AT ALL
1. FEELING NERVOUS, ANXIOUS, OR ON EDGE: NOT AT ALL
4. TROUBLE RELAXING: NOT AT ALL
5. BEING SO RESTLESS THAT IT IS HARD TO SIT STILL: NOT AT ALL
2. NOT BEING ABLE TO STOP OR CONTROL WORRYING: NOT AT ALL

## 2018-11-13 ASSESSMENT — PATIENT HEALTH QUESTIONNAIRE - PHQ9: SUM OF ALL RESPONSES TO PHQ QUESTIONS 1-9: 0

## 2018-11-13 ASSESSMENT — PAIN SCALES - GENERAL: PAINLEVEL: NO PAIN (0)

## 2018-11-13 NOTE — PATIENT INSTRUCTIONS
ASSESSMENT/PLAN:     1. Vaginal irritation  - Check patrice in 2 weeks, please call if not resolved, will refer to OB as needed  - Return sooner as needed        Irma Buchanan NP  [facility]

## 2018-11-13 NOTE — NURSING NOTE
"Chief Complaint   Patient presents with     Derm Problem       Initial /70 (BP Location: Right arm, Patient Position: Sitting, Cuff Size: Adult Regular)  Pulse 73  Temp 97.2  F (36.2  C) (Tympanic)  Resp 18  Ht 5' 5.75\" (1.67 m)  Wt 153 lb 3.2 oz (69.5 kg)  SpO2 97%  BMI 24.92 kg/m2 Estimated body mass index is 24.92 kg/(m^2) as calculated from the following:    Height as of this encounter: 5' 5.75\" (1.67 m).    Weight as of this encounter: 153 lb 3.2 oz (69.5 kg).  Medication Reconciliation: complete    Ashley A. Lechevalier, LPN  "

## 2018-11-13 NOTE — MR AVS SNAPSHOT
After Visit Summary   11/13/2018    Farida Baldwin    MRN: [id number]           Patient Information     Date Of Birth          1950        Visit Information        Provider Department      11/13/2018 2:45 PM Irma Buchanan NP [facility]        Today's Diagnoses     Vaginal irritation    -  1      Care Instructions        ASSESSMENT/PLAN:     1. Vaginal irritation  - Check patrice in 2 weeks, please call if not resolved, will refer to OB as needed  - Return sooner as needed        Irma Buchanan NP  [facility]            Follow-ups after your visit        Who to contact     If you have questions or need follow up information about today's clinic visit or your schedule please contact [facility] directly at [phone].  Normal or non-critical lab and imaging results will be communicated to you by MyChart, letter or phone within 4 business days after the clinic has received the results. If you do not hear from us within 7 days, please contact the clinic through MyChart or phone. If you have a critical or abnormal lab result, we will notify you by phone as soon as possible.  Submit refill requests through [facility] or call your pharmacy and they will forward the refill request to us. Please allow 3 business days for your refill to be completed.          Additional Information About Your Visit        MyChart Information     [facility] gives you secure access to your electronic health record. If you see a primary care provider, you can also send messages to your care team and make appointments. If you have questions, please call your primary care clinic.  If you do not have a primary care provider, please call [phone] and they will assist you.        Care EveryWhere ID     This is your Care EveryWhere ID. This could be used by other organizations to access your [place] medical records  [id number]        Your Vitals Were     Pulse  "Temperature Respirations Height Pulse Oximetry BMI (Body Mass Index)    73 97.2  F (36.2  C) (Tympanic) 18 5' 5.75\" (1.67 m) 97% 24.92 kg/m2       Blood Pressure from Last 3 Encounters:   11/13/18 128/70   05/16/18 118/72   02/21/18 112/68    Weight from Last 3 Encounters:   11/13/18 153 lb 3.2 oz (69.5 kg)   05/16/18 149 lb (67.6 kg)   02/21/18 147 lb (66.7 kg)              Today, you had the following     No orders found for display         Today's Medication Changes          These changes are accurate as of 11/13/18  3:04 PM.  If you have any questions, ask your nurse or doctor.               These medicines have changed or have updated prescriptions.        Dose/Directions    aspirin 81 MG EC tablet   This may have changed:  when to take this   Used for:  Hyperlipidemia LDL goal < 100        Dose:  81 mg   Take 1 tablet (81 mg) by mouth daily   Quantity:  90 tablet   Refills:  3                Primary Care Provider Office Phone # Fax #    Irma Buchanan [phone] [address]  [place] [address]        Equal Access to Services     FLOR[place] CONRAD AH: Hadii marika Lemus, waaxda luneemaadaha, qaybta kaalmada adeannelyada, carolynn bermeo. So [facility] [phone].    ATENCIÓN: Si habla español, tiene a valle disposición servicios gratuitos de asistencia lingüística. Llame al [phone].    We comply with applicable federal civil rights laws and [place] laws. We do not discriminate on the basis of race, color, national origin, age, disability, sex, sexual orientation, or gender identity.            Thank you!     Thank you for choosing [facility] [place]  for your care. Our goal is always to provide you with excellent care. Hearing back from our patients is one way we can continue to improve our services. Please take a few minutes to complete the written survey that you may receive in the mail after your visit with us. Thank you!           "   Your Updated Medication List - Protect others around you: Learn how to safely use, store and throw away your medicines at www.disposemymeds.org.          This list is accurate as of 11/13/18  3:04 PM.  Always use your most recent med list.                   Brand Name Dispense Instructions for use Diagnosis    aspirin 81 MG EC tablet     90 tablet    Take 1 tablet (81 mg) by mouth daily    Hyperlipidemia LDL goal < 100       calcium carbonate 600 mg-vitamin D 400 units 600-400 MG-UNIT per tablet    CALTRATE     Take 1 tablet by mouth daily        MULTIVITAMIN/IRON PO      Take by mouth daily 1 daily        pantoprazole 40 MG EC tablet    PROTONIX    90 tablet    TAKE 1 TABLET BY MOUTH DAILY 30 TO 60 MINUTES BEFORE A MEAL    Gastroesophageal reflux disease with esophagitis       pravastatin 20 MG tablet    PRAVACHOL    90 tablet    Take 1 tablet (20 mg) by mouth daily    Hyperlipidemia LDL goal <100       Vitamin C 500 MG Caps      Take  by mouth. 1 cap daily        VITAMIN D3 PO      Take 2,000 Units by mouth daily        VITAMIN E      1 tab daily

## 2018-11-14 ASSESSMENT — ANXIETY QUESTIONNAIRES: GAD7 TOTAL SCORE: 0

## 2018-12-07 ENCOUNTER — TELEPHONE (OUTPATIENT)
Dept: FAMILY MEDICINE | Facility: OTHER | Age: 68
End: 2018-12-07

## 2018-12-07 DIAGNOSIS — N89.8 VAGINAL IRRITATION: ICD-10-CM

## 2018-12-07 DIAGNOSIS — N95.1 VAGINAL DRYNESS, MENOPAUSAL: Primary | ICD-10-CM

## 2018-12-07 NOTE — TELEPHONE ENCOUNTER
I sent the referral to OB in [place], Hamida Davila -  [facility] is far out    Irma Buchanan [facility]  [phone]

## 2018-12-07 NOTE — TELEPHONE ENCOUNTER
Patient requesting referral to OB, prefers [place], but will go to [place] if needed.  Page Trujillo

## 2018-12-07 NOTE — TELEPHONE ENCOUNTER
ASSESSMENT/PLAN:      1. Vaginal irritation  - Check patrice in 2 weeks, please call if not resolved, will refer to OB as needed  - Return sooner as needed      From last office visit  11-16-18.     Irma Buchanan NP  [facility]

## 2018-12-12 ENCOUNTER — OFFICE VISIT (OUTPATIENT)
Dept: OBGYN | Facility: OTHER | Age: 68
End: 2018-12-12
Attending: NURSE PRACTITIONER
Payer: COMMERCIAL

## 2018-12-12 VITALS
BODY MASS INDEX: 23.78 KG/M2 | HEART RATE: 76 BPM | HEIGHT: 66 IN | SYSTOLIC BLOOD PRESSURE: 112 MMHG | DIASTOLIC BLOOD PRESSURE: 62 MMHG | WEIGHT: 148 LBS

## 2018-12-12 DIAGNOSIS — N89.8 VAGINAL IRRITATION: ICD-10-CM

## 2018-12-12 DIAGNOSIS — N95.1 VAGINAL DRYNESS, MENOPAUSAL: ICD-10-CM

## 2018-12-12 PROCEDURE — G0463 HOSPITAL OUTPT CLINIC VISIT: HCPCS | Mod: 25

## 2018-12-12 PROCEDURE — G0463 HOSPITAL OUTPT CLINIC VISIT: HCPCS

## 2018-12-12 PROCEDURE — 99202 OFFICE O/P NEW SF 15 MIN: CPT | Performed by: NURSE PRACTITIONER

## 2018-12-12 RX ORDER — ESTRADIOL 0.1 MG/G
1 CREAM VAGINAL DAILY
Qty: 42.5 G | Refills: 0 | Status: SHIPPED | OUTPATIENT
Start: 2018-12-12 | End: 2018-12-12

## 2018-12-12 RX ORDER — ESTRADIOL 0.1 MG/G
1 CREAM VAGINAL DAILY
Qty: 42.5 G | Refills: 0 | Status: SHIPPED | OUTPATIENT
Start: 2018-12-12 | End: 2021-03-30

## 2018-12-12 ASSESSMENT — MIFFLIN-ST. JEOR: SCORE: 1209.1

## 2018-12-12 ASSESSMENT — PAIN SCALES - GENERAL: PAINLEVEL: NO PAIN (0)

## 2018-12-12 NOTE — NURSING NOTE
"Chief Complaint   Patient presents with     Consult     Consult from Irma Buchanan for vaginal irritation       Initial /62 (BP Location: Left arm, Patient Position: Sitting, Cuff Size: Adult Regular)   Pulse 76   Ht 1.67 m (5' 5.75\")   Wt 67.1 kg (148 lb)   BMI 24.07 kg/m   Estimated body mass index is 24.07 kg/m  as calculated from the following:    Height as of this encounter: 1.67 m (5' 5.75\").    Weight as of this encounter: 67.1 kg (148 lb).  Medication Reconciliation: complete    SARY TELLEZ LPN  "

## 2018-12-14 NOTE — PROGRESS NOTES
"CC:  Consult from Irma Buchanan NP for vaginal irritation  HPI:  Farida Baldwin is a 68 year old female presenting for concerns of vaginal irritation and \"a long cut\" in her vaginal area.  She denies pain or dysuria. Denies trauma.  Has not been sexually active in the past 6 months.  No vaginal discharge.  She did have one episode of bleeding last week when she wiped but no further bleeding.     Patients records are available and reviewed at today's visit.        Past Medical History:   Diagnosis Date     Gastroesophageal reflux disease with esophagitis 1/27/2016     Hyperlipidemia LDL goal < 100 9/7/2012     Osteopenia 4/25/2014     Taking a statin medication 5/10/2017     Tubulovillous adenoma polyp of colon 9/7/2012       Past Surgical History:   Procedure Laterality Date     APPENDECTOMY  1956     BIOPSY      polypectomy-benign     COLONOSCOPY  5/17/2012     COLONOSCOPY  5/30/2013    Procedure: COLONOSCOPY;  COLONOSCOPY WITH BIOPSIES;  Surgeon: Tana Benjamin MD;  [facility]     COLONOSCOPY  11/12/2013    Procedure: COLONOSCOPY;  WHOLE COLON COLONOSCOPY WITH POLYPECTOMY;  Surgeon: Tana Benjamin MD;  [facility]     COLONOSCOPY N/A 11/23/2015    Procedure: COLONOSCOPY;  Surgeon: Tana Benjamin MD;  [facility]     COLONOSCOPY N/A 12/13/2017    Procedure: COLONOSCOPY;  COLONOSCOPY WITH POLYPECTOMY;  Surgeon: Arthur Alaniz DO;  [facility]     ESOPHAGOSCOPY, GASTROSCOPY, DUODENOSCOPY (EGD), COMBINED N/A 2/12/2016    Procedure: COMBINED ESOPHAGOSCOPY, GASTROSCOPY, DUODENOSCOPY (EGD);  Surgeon: Arthur Alaniz DO;  [facility]     GYN SURGERY      complete hyst     Laproscopic-assisted resection of right colon neoplasm  2012     TONSILLECTOMY  1955       Family History   Problem Relation Age of Onset     Other - See Comments Father         (82 Diagnosis); cause of death     C.A.D. Father      Cancer Mother 38        pancreatic       Allergies: Fosamax [alendronic " "acid]    Current Outpatient Medications   Medication Sig Dispense Refill     Ascorbic Acid (VITAMIN C) 500 MG CAPS Take  by mouth. 1 cap daily       aspirin 81 MG EC tablet Take 1 tablet (81 mg) by mouth daily (Patient taking differently: Take 81 mg by mouth three times a week ) 90 tablet 3     calcium-vitamin D (CALTRATE) 600-400 MG-UNIT per tablet Take 1 tablet by mouth daily        Cholecalciferol (VITAMIN D3 PO) Take 2,000 Units by mouth daily        estradiol (ESTRACE) 0.1 MG/GM vaginal cream Place 1 g vaginally daily Massage a pea size amount into the vaginal tissues daily. 42.5 g 0     Multiple Vitamins-Iron (MULTIVITAMIN/IRON PO) Take by mouth daily 1 daily       pravastatin (PRAVACHOL) 20 MG tablet Take 1 tablet (20 mg) by mouth daily 90 tablet 1     VITAMIN E 1 tab daily       pantoprazole (PROTONIX) 40 MG EC tablet TAKE 1 TABLET BY MOUTH DAILY 30 TO 60 MINUTES BEFORE A MEAL (Patient not taking: Reported on 11/13/2018) 90 tablet 2       ROS:  CONSTITUTIONAL:NEGATIVE for fever, chills, change in weight    EXAM:  Blood pressure 112/62, pulse 76, height 1.67 m (5' 5.75\"), weight 67.1 kg (148 lb), not currently breastfeeding.   BMI= Body mass index is 24.07 kg/m .  General - pleasant female in no acute distress.  Pelvic - EG: normal adult female postmenopause. Urethra is inflamed and red.  No lesions or tears found.tissues pale and dry with atrophy.     ASSESSMENT/PLAN:  (N95.1) Vaginal dryness, menopausal  Comment: Discussed with her that I think the cut she was describing was actually her urethra.  She will apply a pea size amount of estrace cream to this area daily and follow up in 2-4 weeks.   Plan: estradiol (ESTRACE) 0.1 MG/GM vaginal cream,             Letter will be sent to the referring provider    EMILY Ordaz  "

## 2019-01-17 ENCOUNTER — OFFICE VISIT (OUTPATIENT)
Dept: OBGYN | Facility: OTHER | Age: 69
End: 2019-01-17
Attending: NURSE PRACTITIONER
Payer: COMMERCIAL

## 2019-01-17 VITALS
HEART RATE: 64 BPM | DIASTOLIC BLOOD PRESSURE: 72 MMHG | BODY MASS INDEX: 23.78 KG/M2 | SYSTOLIC BLOOD PRESSURE: 120 MMHG | HEIGHT: 66 IN | WEIGHT: 148 LBS

## 2019-01-17 DIAGNOSIS — N95.1 VAGINAL DRYNESS, MENOPAUSAL: Primary | ICD-10-CM

## 2019-01-17 PROCEDURE — G0463 HOSPITAL OUTPT CLINIC VISIT: HCPCS | Mod: 25

## 2019-01-17 PROCEDURE — G0463 HOSPITAL OUTPT CLINIC VISIT: HCPCS

## 2019-01-17 PROCEDURE — 99213 OFFICE O/P EST LOW 20 MIN: CPT | Performed by: NURSE PRACTITIONER

## 2019-01-17 ASSESSMENT — PAIN SCALES - GENERAL: PAINLEVEL: NO PAIN (0)

## 2019-01-17 ASSESSMENT — MIFFLIN-ST. JEOR: SCORE: 1209.1

## 2019-01-17 NOTE — NURSING NOTE
"Chief Complaint   Patient presents with     Follow Up       Initial /72   Pulse 64   Ht 1.67 m (5' 5.75\")   Wt 67.1 kg (148 lb)   BMI 24.07 kg/m   Estimated body mass index is 24.07 kg/m  as calculated from the following:    Height as of this encounter: 1.67 m (5' 5.75\").    Weight as of this encounter: 67.1 kg (148 lb).  Medication Reconciliation: complete    Zenia Redmond LPN    "

## 2019-01-17 NOTE — PROGRESS NOTES
"[facility]   Farida is here to follow up after 4 weeks using vaginal estrace cream for treatment of vaginitis.  She denies vaginal irritation, itching, burning, or bleeding when wiping.              Medications:     Current Outpatient Medications Ordered in Epic   Medication     Ascorbic Acid (VITAMIN C) 500 MG CAPS     aspirin 81 MG EC tablet     calcium-vitamin D (CALTRATE) 600-400 MG-UNIT per tablet     Cholecalciferol (VITAMIN D3 PO)     estradiol (ESTRACE) 0.1 MG/GM vaginal cream     Multiple Vitamins-Iron (MULTIVITAMIN/IRON PO)     pantoprazole (PROTONIX) 40 MG EC tablet     pravastatin (PRAVACHOL) 20 MG tablet     VITAMIN E     No current [facility]-ordered facility-administered medications on file.                 Allergies:   Fosamax [alendronic acid]         Review of Systems:   The 5 point Review of Systems is negative other than noted in the HPI                     Physical Exam:   Blood pressure 120/72, pulse 64, height 1.67 m (5' 5.75\"), weight 67.1 kg (148 lb), not currently breastfeeding.  Constitutional:   awake, alert, cooperative, no apparent distress, and appears stated age     Genitounirinary:   External Genitalia:  General appearance; normal  Urethra:  No irritation or redness noted  Vagina:  Estrogen effect normal, Lesions absent               Assessment and Plan:   Atrophic vaginitis - tissues improved.  Continue to apply a pea size amount to tissues 2-3 times weekly and may increase up to once daily if needed.  Follow up annually and as needed.     EMILY Ordaz  1/17/2019  11:57 AM  "

## 2019-01-17 NOTE — PATIENT INSTRUCTIONS
Patient Education     Atrophic Vaginitis    Atrophic vaginitis means the walls of your vagina have become thin. This happens when your body makes too little of the hormone estrogen. Menopause or surgical removal of the ovaries are the most common causes for a drop in estrogen. Breastfeeding can also cause the hormone level to drop.  Symptoms of atrophic vaginitis include:    Dryness, soreness, burning, or itching in the vagina    Vaginal discharge  Sex can be uncomfortable, even painful. After sex, you may have bleeding from your vagina. You may also have burning or pain when you urinate.  Home care  Your healthcare provider may recommend 1 or more of these as treatment:    Vaginal creams, lotions, and lubricants. These products help relieve vaginal dryness. They don t need a prescription. They can be found in the personal care section of most pharmacies. Creams and lotions are used daily to help keep the vagina moist. Lubricants help reduce dryness and pain during sex. Choose water-based lubricants. Don t use petroleum jelly, mineral oil, or other oils. These increase the chance of infection.     Hormone therapy (HT). HT increases the amount of estrogen in your body. This can help manage or relieve symptoms. HT can be given in pill form. It may be given as a lotion, cream, ring put into the vagina, or a patch on the skin. The risks and benefits of HT vary for each woman. For instance, your risk may be higher if you have had breast cancer. Discuss this treatment with your healthcare provider. Not every woman can use HT.  You don t need to give up (abstain from) sex. In fact, regular sex can help keep vaginal tissues healthy. Take steps to make sex more comfortable by using water-based lubricants.  Preventing infections  Atrophic vaginitis makes an infection of the vagina or the urinary tract more likely. To help reduce your risk:    Keep your genitals clean. When you bathe, wash the outside of your vagina with  mild soap and water. Clean gently between the folds of your vagina.    Wipe from front to back after a bowel movement.    Don t douche unless your healthcare provider tells you to.    Avoid scented toilet paper, scented vaginal sprays, and scented tampons.    Avoid wearing clothes that are tight in the crotch. These include pantyhose, jeans, and leggings. Wear cotton underwear. Change it every day.  Follow-up care  Follow up with your healthcare provider, or as advised.  When to seek medical advice  Call your health care provider right away if any of these occur:    Fever of 100.4 F (38 C) or higher, or as directed by your healthcare provider    Symptoms don t go away or get worse even with treatment    Vaginal area swells or becomes painful    Vaginal area bleeds, but not because of your period    Bad-smelling discharge from the vagina    Pain or burning when you urinate or you have trouble passing urine    Open sores develop around vagina   Date Last Reviewed: 12/1/2016 2000-2018 [employer]. [address] [place] [address], [place] [address]. All rights reserved. This information is not intended as a substitute for professional medical care. Always follow your healthcare professional's instructions.

## 2019-03-21 DIAGNOSIS — E78.5 HYPERLIPIDEMIA LDL GOAL <100: ICD-10-CM

## 2019-03-21 RX ORDER — PRAVASTATIN SODIUM 20 MG
TABLET ORAL
Qty: 60 TABLET | Refills: 0 | Status: SHIPPED | OUTPATIENT
Start: 2019-03-21 | End: 2019-05-17

## 2019-03-21 NOTE — TELEPHONE ENCOUNTER
pravastatin (PRAVACHOL) 20 MG tablet  Last Written Prescription Date:  9/24/18  Last Fill Quantity: 90,   # refills: 1  Last Office Visit: 11/13/18  Future Office visit:

## 2019-03-30 DIAGNOSIS — E78.5 HYPERLIPIDEMIA LDL GOAL <100: ICD-10-CM

## 2019-04-01 DIAGNOSIS — Z12.39 BREAST SCREENING: Primary | ICD-10-CM

## 2019-04-01 RX ORDER — PRAVASTATIN SODIUM 20 MG
TABLET ORAL
Qty: 30 TABLET | Refills: 0 | Status: SHIPPED | OUTPATIENT
Start: 2019-04-01 | End: 2019-05-16

## 2019-04-24 DIAGNOSIS — E78.5 HYPERLIPIDEMIA LDL GOAL <100: ICD-10-CM

## 2019-04-25 RX ORDER — PRAVASTATIN SODIUM 20 MG
TABLET ORAL
Qty: 30 TABLET | Refills: 0 | Status: SHIPPED | OUTPATIENT
Start: 2019-04-25 | End: 2019-05-16

## 2019-05-16 NOTE — PATIENT INSTRUCTIONS
ASSESSMENT / PLAN:   1. Hyperlipidemia LDL goal <100  - pravastatin (PRAVACHOL) 20 MG tablet; Take 1 tablet (20 mg) by mouth daily  Dispense: 90 tablet; Refill: 1  - Lipid Profile  - Comprehensive metabolic panel (BMP + Alb, Alk Phos, ALT, AST, Total. Bili, TP)  - TSH with free T4 reflex  - UA reflex to Microscopic and Culture - [facility]    2. Annual physical exam  - UA reflex to Microscopic and Culture - [facility]    3. Encounter for screening mammogram for breast cancer  - MA Screening Digital Bilateral; Future    4. On statin therapy  - Comprehensive metabolic panel (BMP + Alb, Alk Phos, ALT, AST, Total. Bili, TP)            Preventive Health Recommendations    See your health care provider every year to    Review health changes.     Discuss preventive care.      Review your medicines if your doctor has prescribed any.      You no longer need a yearly Pap test unless you've had an abnormal Pap test in the past 10 years. If you have vaginal symptoms, such as bleeding or discharge, be sure to talk with your provider about a Pap test.      Every 1 to 2 years, have a mammogram.  If you are over 69, talk with your health care provider about whether or not you want to continue having screening mammograms.      Every 10 years, have a colonoscopy. Or, have a yearly FIT test (stool test). These exams will check for colon cancer.       Have a cholesterol test every 5 years, or more often if your doctor advises it.       Have a diabetes test (fasting glucose) every three years. If you are at risk for diabetes, you should have this test more often.       At age 65, have a bone density scan (DEXA) to check for osteoporosis (brittle bone disease).    Shots:    Get a flu shot each year.    Get a tetanus shot every 10 years.    Talk to your doctor about your pneumonia vaccines. There are now two you should receive - Pneumovax (PPSV 23) and Prevnar (PCV 13).    Talk to your pharmacist about the shingles  vaccine.    Talk to your doctor about the hepatitis B vaccine.    Nutrition:     Eat at least 5 servings of fruits and vegetables each day.      Eat whole-grain bread, whole-wheat pasta and brown rice instead of white grains and rice.      Get adequate about Calcium and Vitamin D.     Lifestyle    Exercise at least 150 minutes a week (30 minutes a day, 5 days a week). This will help you control your weight and prevent disease.      Limit alcohol to one drink per day.      No smoking.       Wear sunscreen to prevent skin cancer.       See your dentist twice a year for an exam and cleaning.      See your eye doctor every 1 to 2 years to screen for conditions such as glaucoma, macular degeneration, cataracts, etc.    Personalized Prevention Plan  You are due for the preventive services outlined below.  Your care team is available to assist you in scheduling these services.  If you have already completed any of these items, please share that information with your care team to update in your medical record.    Health Maintenance Due   Topic Date Due     Diptheria Tetanus Pertussis (DTAP/TDAP/TD) Vaccine (1 - Tdap) 08/22/1974     Zoster (Shingles) Vaccine (1 of 2) 08/22/1999     Annual Wellness Visit  05/16/2019     Mammogram - yearly  05/30/2019

## 2019-05-16 NOTE — PROGRESS NOTES
"BJECTIVE:   Farida Baldwin is a 68 year old female who presents for Preventive Visit.    Are you in the first 12 months of your Medicare Part B coverage?  unsure    Physical Health:    In general, how would you rate your overall physical health? good    Outside of work, how many days during the week do you exercise? 2-3 days/week    Outside of work, approximately how many minutes a day do you exercise?greater than 60 minutes  If you drink alcohol do you typically have >3 drinks per day or >7 drinks per week? NO          Do you usually eat at least 4 servings of fruit and vegetables a day, include whole grains & fiber and avoid regularly eating high fat or \"junk\" foods? Yes    Do you have any problems taking medications regularly?  No    Do you have any side effects from medications? none    Needs assistance for the following daily activities: no assistance needed    Which of the following safety concerns are present in your home?  none identified     Hearing impairment: No    In the past 6 months, have you been bothered by leaking of urine? no      PHQ-9 SCORE 2/21/2018 5/16/2018 11/13/2018   PHQ-9 Total Score - - -   PHQ-9 Total Score 1 0 0     JENNIFER-7 SCORE 2/21/2018 5/16/2018 11/13/2018   Total Score 0 0 0         Do you feel safe in your environment? Yes    Do you have a Health Care Directive? Yes: Patient states has Advance Directive and will bring in a copy to clinic.    Additional concerns to address?  No    Fall risk:  Fallen 2 or more times in the past year?: No  Any fall with injury in the past year?: No      Do you have sleep apnea, excessive snoring or daytime drowsiness?: no        Gerd/Heartburn  Duration of complaint: Controlled       Hyperlipidemia Follow-Up    Rate your low fat/cholesterol diet?: fair    Taking statin?  No    Other lipid medications/supplements?:  none      Reviewed and updated as needed this visit by clinical staff  Tobacco  Allergies  Meds         Reviewed and updated as needed " this visit by Provider        Social History     Tobacco Use     Smoking status: Former Smoker     Packs/day: 1.00     Years: 25.00     Pack years: 25.00     Types: Cigarettes     Smokeless tobacco: Never Used     Tobacco comment: year quit 1989   Substance Use Topics     Alcohol use: Yes     Comment: 1 beer daily. last drink, last night.                           Current providers sharing in care for this patient include:   Patient Care Team:  Irma Buchanan NP as PCP - General  Irma Buchanan NP as Assigned PCP    The following health maintenance items are reviewed in Epic and correct as of today:  Health Maintenance   Topic Date Due     DTAP/TDAP/TD IMMUNIZATION (1 - Tdap) 08/22/1974     ZOSTER IMMUNIZATION (1 of 2) 08/22/1999     MEDICARE ANNUAL WELLNESS VISIT  05/16/2019     MAMMO Q1 YR  05/30/2019     JENNIFER QUESTIONNAIRE 1 YEAR  11/13/2019     PHQ-9 Q1YR  11/13/2019     COLONOSCOPY Q2 YR  12/18/2019     FALL RISK ASSESSMENT  01/17/2020     ADVANCE DIRECTIVE PLANNING Q5 YRS  05/10/2022     LIPID SCREEN Q5 YR FEMALE (SYSTEM ASSIGNED)  05/16/2023     DEXA SCAN SCREENING (SYSTEM ASSIGNED)  Completed     HEPATITIS C SCREENING  Completed     INFLUENZA VACCINE  Addressed     IPV IMMUNIZATION  Aged Out     MENINGITIS IMMUNIZATION  Aged Out     Lab work is in process  Labs reviewed in EPIC  BP Readings from Last 3 Encounters:   05/17/19 104/64   01/17/19 120/72   12/12/18 112/62    Wt Readings from Last 3 Encounters:   05/17/19 69.9 kg (154 lb)   01/17/19 67.1 kg (148 lb)   12/12/18 67.1 kg (148 lb)                  Patient Active Problem List   Diagnosis     Hyperlipidemia LDL goal <100     Advanced care planning/counseling discussion     Osteopenia     Gastroesophageal reflux disease with esophagitis     History of colonic polyps - tubular adenoma - colonoscopy every 2 years     Taking a statin medication     Past Surgical History:   Procedure Laterality Date     APPENDECTOMY  1956     BIOPSY      polypectomy-benign      COLONOSCOPY  5/17/2012     COLONOSCOPY  5/30/2013    Procedure: COLONOSCOPY;  COLONOSCOPY WITH BIOPSIES;  Surgeon: Tana Benjamin MD;  [facility]     COLONOSCOPY  11/12/2013    Procedure: COLONOSCOPY;  WHOLE COLON COLONOSCOPY WITH POLYPECTOMY;  Surgeon: Tana Benjamin MD;  [facility]     COLONOSCOPY N/A 11/23/2015    Procedure: COLONOSCOPY;  Surgeon: Tana Benjamin MD;  [facility]     COLONOSCOPY N/A 12/13/2017    Procedure: COLONOSCOPY;  COLONOSCOPY WITH POLYPECTOMY;  Surgeon: Arthur Alaniz DO;  [facility]     ESOPHAGOSCOPY, GASTROSCOPY, DUODENOSCOPY (EGD), COMBINED N/A 2/12/2016    Procedure: COMBINED ESOPHAGOSCOPY, GASTROSCOPY, DUODENOSCOPY (EGD);  Surgeon: Arthur Alaniz DO;  Location: [facility]     GYN SURGERY      complete hyst     Laproscopic-assisted resection of right colon neoplasm  2012     TONSILLECTOMY  1955       Social History     Tobacco Use     Smoking status: Former Smoker     Packs/day: 1.00     Years: 25.00     Pack years: 25.00     Types: Cigarettes     Smokeless tobacco: Never Used     Tobacco comment: year quit 1989   Substance Use Topics     Alcohol use: Yes     Comment: 1 beer daily. last drink, last night.     Family History   Problem Relation Age of Onset     Other - See Comments Father         (82 Diagnosis); cause of death     C.A.D. Father      Cancer Mother 38        pancreatic         Current Outpatient Medications   Medication Sig Dispense Refill     Ascorbic Acid (VITAMIN C) 500 MG CAPS Take  by mouth. 1 cap daily       aspirin 81 MG EC tablet Take 1 tablet (81 mg) by mouth daily (Patient taking differently: Take 81 mg by mouth three times a week ) 90 tablet 3     estradiol (ESTRACE) 0.1 MG/GM vaginal cream Place 1 g vaginally daily Massage a pea size amount into the vaginal tissues daily. 42.5 g 0     Multiple Vitamins-Iron (MULTIVITAMIN/IRON PO) Take by mouth daily 1 daily       pravastatin (PRAVACHOL) 20 MG tablet Take 1 tablet (20 mg) by  "mouth daily 90 tablet 1     VITAMIN E 1 tab daily       Allergies   Allergen Reactions     Fosamax [Alendronic Acid] Cramps     Muscle cramps     Recent Labs   Lab Test 05/16/18  1042 05/10/17  0955 05/04/16  0941   LDL 81 77 89   HDL 66 74 68   TRIG 151* 114 152*   ALT 51* 31 39   CR 0.90 0.90  --    GFRESTIMATED 62 62  --    GFRESTBLACK 75 75  --    POTASSIUM 3.9 3.7  --    TSH 1.12 0.97  --           ROS:  Constitutional, HEENT, cardiovascular, pulmonary, GI, , musculoskeletal, neuro, skin, endocrine and psych systems are negative, except as otherwise noted.    OBJECTIVE:   /64 (BP Location: Left arm, Patient Position: Sitting, Cuff Size: Adult Regular)   Pulse 68   Temp 97.6  F (36.4  C) (Tympanic)   Resp 14   Ht 1.657 m (5' 5.25\")   Wt 69.9 kg (154 lb)   SpO2 98%   BMI 25.43 kg/m   Estimated body mass index is 25.43 kg/m  as calculated from the following:    Height as of this encounter: 1.657 m (5' 5.25\").    Weight as of this encounter: 69.9 kg (154 lb).       EXAM:           The 10-year ASCVD risk score (Troy LIYAH Jr., et al., 2013) is: 5.3%    Values used to calculate the score:      Age: 68 years      Sex: Female      Is Non- : No      Diabetic: No      Tobacco smoker: No      Systolic Blood Pressure: 104 mmHg      Is BP treated: No      HDL Cholesterol: 66 mg/dL      Total Cholesterol: 177 mg/dL         ASSESSMENT / PLAN:   1. Hyperlipidemia LDL goal <100  - pravastatin (PRAVACHOL) 20 MG tablet; Take 1 tablet (20 mg) by mouth daily  Dispense: 90 tablet; Refill: 1  - Lipid Profile  - Comprehensive metabolic panel (BMP + Alb, Alk Phos, ALT, AST, Total. Bili, TP)  - TSH with free T4 reflex  - UA reflex to Microscopic and Culture - [facility]    2. Annual physical exam  - UA reflex to Microscopic and Culture - [facility]    3. Encounter for screening mammogram for breast cancer  - MA Screening Digital Bilateral; Future    4. On statin therapy  - Comprehensive " "metabolic panel (BMP + Alb, Alk Phos, ALT, AST, Total. Bili, TP)        COUNSELING:  Reviewed preventive health counseling, as reflected in patient instructions       Regular exercise       Healthy diet/nutrition    Estimated body mass index is 25.43 kg/m  as calculated from the following:    Height as of this encounter: 1.657 m (5' 5.25\").    Weight as of this encounter: 69.9 kg (154 lb).         reports that she has quit smoking. Her smoking use included cigarettes. She has a 25.00 pack-year smoking history. She has never used smokeless tobacco.      Appropriate preventive services were discussed with this patient, including applicable screening as appropriate for cardiovascular disease, diabetes, osteopenia/osteoporosis, and glaucoma.  As appropriate for age/gender, discussed screening for colorectal cancer, prostate cancer, breast cancer, and cervical cancer. Checklist reviewing preventive services available has been given to the patient.    Reviewed patients plan of care and provided an AVS. The Basic Care Plan (routine screening as documented in Health Maintenance) for Farida meets the Care Plan requirement. This Care Plan has been established and reviewed with the Patient.    Counseling Resources:  ATP IV Guidelines  Pooled Cohorts Equation Calculator  Breast Cancer Risk Calculator  FRAX Risk Assessment  ICSI Preventive Guidelines  Dietary Guidelines for Americans, 2010  USDA's MyPlate  ASA Prophylaxis  Lung CA Screening    Irma Buchanan NP  [facility] [place] [facility]  "

## 2019-05-17 ENCOUNTER — OFFICE VISIT (OUTPATIENT)
Dept: FAMILY MEDICINE | Facility: OTHER | Age: 69
End: 2019-05-17
Attending: NURSE PRACTITIONER
Payer: COMMERCIAL

## 2019-05-17 VITALS
RESPIRATION RATE: 14 BRPM | TEMPERATURE: 97.6 F | HEART RATE: 68 BPM | WEIGHT: 154 LBS | SYSTOLIC BLOOD PRESSURE: 104 MMHG | OXYGEN SATURATION: 98 % | DIASTOLIC BLOOD PRESSURE: 64 MMHG | HEIGHT: 65 IN | BODY MASS INDEX: 25.66 KG/M2

## 2019-05-17 DIAGNOSIS — Z00.00 ANNUAL PHYSICAL EXAM: Primary | ICD-10-CM

## 2019-05-17 DIAGNOSIS — K21.9 GASTROESOPHAGEAL REFLUX DISEASE WITHOUT ESOPHAGITIS: ICD-10-CM

## 2019-05-17 DIAGNOSIS — E78.5 HYPERLIPIDEMIA LDL GOAL <100: ICD-10-CM

## 2019-05-17 DIAGNOSIS — Z79.899 ON STATIN THERAPY: ICD-10-CM

## 2019-05-17 DIAGNOSIS — Z12.31 ENCOUNTER FOR SCREENING MAMMOGRAM FOR BREAST CANCER: ICD-10-CM

## 2019-05-17 LAB
ALBUMIN SERPL-MCNC: 4.2 G/DL (ref 3.4–5)
ALBUMIN UR-MCNC: NEGATIVE MG/DL
ALP SERPL-CCNC: 49 U/L (ref 40–150)
ALT SERPL W P-5'-P-CCNC: 41 U/L (ref 0–50)
ANION GAP SERPL CALCULATED.3IONS-SCNC: 5 MMOL/L (ref 3–14)
APPEARANCE UR: CLEAR
AST SERPL W P-5'-P-CCNC: 25 U/L (ref 0–45)
BILIRUB SERPL-MCNC: 0.9 MG/DL (ref 0.2–1.3)
BILIRUB UR QL STRIP: NEGATIVE
BUN SERPL-MCNC: 19 MG/DL (ref 7–30)
CALCIUM SERPL-MCNC: 9.2 MG/DL (ref 8.5–10.1)
CHLORIDE SERPL-SCNC: 106 MMOL/L (ref 94–109)
CHOLEST SERPL-MCNC: 168 MG/DL
CO2 SERPL-SCNC: 27 MMOL/L (ref 20–32)
COLOR UR AUTO: YELLOW
CREAT SERPL-MCNC: 0.86 MG/DL (ref 0.52–1.04)
GFR SERPL CREATININE-BSD FRML MDRD: 68 ML/MIN/{1.73_M2}
GLUCOSE SERPL-MCNC: 97 MG/DL (ref 70–99)
GLUCOSE UR STRIP-MCNC: NEGATIVE MG/DL
HDLC SERPL-MCNC: 65 MG/DL
HGB UR QL STRIP: NEGATIVE
KETONES UR STRIP-MCNC: NEGATIVE MG/DL
LDLC SERPL CALC-MCNC: 81 MG/DL
LEUKOCYTE ESTERASE UR QL STRIP: NEGATIVE
NITRATE UR QL: NEGATIVE
NONHDLC SERPL-MCNC: 103 MG/DL
PH UR STRIP: 5.5 PH (ref 5–7)
POTASSIUM SERPL-SCNC: 4.5 MMOL/L (ref 3.4–5.3)
PROT SERPL-MCNC: 7.5 G/DL (ref 6.8–8.8)
SODIUM SERPL-SCNC: 138 MMOL/L (ref 133–144)
SOURCE: NORMAL
SP GR UR STRIP: 1.01 (ref 1–1.03)
TRIGL SERPL-MCNC: 110 MG/DL
TSH SERPL DL<=0.005 MIU/L-ACNC: 1.33 MU/L (ref 0.4–4)
UROBILINOGEN UR STRIP-ACNC: 0.2 EU/DL (ref 0.2–1)

## 2019-05-17 PROCEDURE — 80053 COMPREHEN METABOLIC PANEL: CPT | Mod: ZL | Performed by: NURSE PRACTITIONER

## 2019-05-17 PROCEDURE — 81003 URINALYSIS AUTO W/O SCOPE: CPT | Mod: ZL | Performed by: NURSE PRACTITIONER

## 2019-05-17 PROCEDURE — 99397 PER PM REEVAL EST PAT 65+ YR: CPT | Performed by: NURSE PRACTITIONER

## 2019-05-17 PROCEDURE — 36415 COLL VENOUS BLD VENIPUNCTURE: CPT | Mod: ZL | Performed by: NURSE PRACTITIONER

## 2019-05-17 PROCEDURE — 80061 LIPID PANEL: CPT | Mod: ZL | Performed by: NURSE PRACTITIONER

## 2019-05-17 PROCEDURE — G0463 HOSPITAL OUTPT CLINIC VISIT: HCPCS

## 2019-05-17 PROCEDURE — 84443 ASSAY THYROID STIM HORMONE: CPT | Mod: ZL | Performed by: NURSE PRACTITIONER

## 2019-05-17 RX ORDER — PRAVASTATIN SODIUM 20 MG
20 TABLET ORAL DAILY
Qty: 90 TABLET | Refills: 1 | Status: SHIPPED | OUTPATIENT
Start: 2019-05-17 | End: 2020-05-04

## 2019-05-17 ASSESSMENT — PAIN SCALES - GENERAL: PAINLEVEL: NO PAIN (0)

## 2019-05-17 ASSESSMENT — MIFFLIN-ST. JEOR: SCORE: 1228.38

## 2019-05-17 NOTE — RESULT ENCOUNTER NOTE
Labs look really good.  She can try 1/2 dose of her lipid medication if she'd like.    Pls mail her her ASCVD score as below - I told her we could send it.  Goal is under 10, she is at 5.2, which is good.    The 10-year ASCVD risk score (Facundo PELLETIER Jr., et al., 2013) is: 5.2%    Values used to calculate the score:      Age: 68 years      Sex: Female      Is Non- : No      Diabetic: No      Tobacco smoker: No      Systolic Blood Pressure: 104 mmHg      Is BP treated: No      HDL Cholesterol: 65 mg/dL      Total Cholesterol: 168 mg/dL    [facility]KS[facility]  [phone]

## 2019-05-17 NOTE — NURSING NOTE
"Chief Complaint   Patient presents with     Physical       Initial /64 (BP Location: Left arm, Patient Position: Sitting, Cuff Size: Adult Regular)   Pulse 68   Temp 97.6  F (36.4  C) (Tympanic)   Resp 14   Ht 1.657 m (5' 5.25\")   Wt 69.9 kg (154 lb)   SpO2 98%   BMI 25.43 kg/m   Estimated body mass index is 25.43 kg/m  as calculated from the following:    Height as of this encounter: 1.657 m (5' 5.25\").    Weight as of this encounter: 69.9 kg (154 lb).  Medication Reconciliation: complete    Abi Jernigan LPN    "

## 2019-05-31 ENCOUNTER — ANCILLARY PROCEDURE (OUTPATIENT)
Dept: MAMMOGRAPHY | Facility: OTHER | Age: 69
End: 2019-05-31
Attending: NURSE PRACTITIONER
Payer: COMMERCIAL

## 2019-05-31 DIAGNOSIS — Z12.39 BREAST SCREENING: ICD-10-CM

## 2019-05-31 PROCEDURE — 77063 BREAST TOMOSYNTHESIS BI: CPT | Mod: TC

## 2019-08-20 NOTE — PROGRESS NOTES
Farida Baldwin is a 68 year old female who presents to clinic today for the following health issues:      Dizziness  Onset: 2 weeks    Description:   Do you feel faint:  no   Does it feel like the surroundings (bed, room) are moving: YES  Unsteady/off balance: YES  Have you passed out or fallen: no     Intensity: mild    Progression of Symptoms:  same and intermittent    Accompanying Signs & Symptoms:  Heart palpitations: no   Nausea, vomiting: YES- a little nausea  Weakness in arms or legs: no   Fatigue: no   Vision or speech changes: no   Ringing in ears (Tinnitus): no   Hearing Loss: no   Tingling in the back of her neck, worse when turning her head to the right.    History:   Head trauma/concussion hx: no   Previous similar symptoms: yes  Recent bleeding history: no     Precipitating factors:   Worse with activity or head movement: YES- states it is worse when laying in bed and she turns her head to the side  Any new medications (BP?): no   Alcohol/drug abuse/withdrawal: no     Alleviating factors:   Does staying in a fixed position give relief:  YES- when she turns her head the opposite way it goes away    Therapies Tried and outcome: none      Orthostatic BP:  Layin/76  Sittin/76  Standin/76      Patient Active Problem List   Diagnosis     Hyperlipidemia LDL goal <100     Advanced care planning/counseling discussion     Osteopenia     Gastroesophageal reflux disease with esophagitis     History of colonic polyps - tubular adenoma - colonoscopy every 2 years     Taking a statin medication     Past Surgical History:   Procedure Laterality Date     APPENDECTOMY       BIOPSY      polypectomy-benign     COLONOSCOPY  2012     COLONOSCOPY  2013    Procedure: COLONOSCOPY;  COLONOSCOPY WITH BIOPSIES;  Surgeon: Tana Benjamin MD;  [facility]     COLONOSCOPY  2013    Procedure: COLONOSCOPY;  WHOLE COLON COLONOSCOPY WITH POLYPECTOMY;  Surgeon: Tana Benjamin MD;   [facility]     COLONOSCOPY N/A 11/23/2015    Procedure: COLONOSCOPY;  Surgeon: Tana Benjamin MD;  [facility]     COLONOSCOPY N/A 12/13/2017    Procedure: COLONOSCOPY;  COLONOSCOPY WITH POLYPECTOMY;  Surgeon: Arthur Alaniz DO;  [facility]     ESOPHAGOSCOPY, GASTROSCOPY, DUODENOSCOPY (EGD), COMBINED N/A 2/12/2016    Procedure: COMBINED ESOPHAGOSCOPY, GASTROSCOPY, DUODENOSCOPY (EGD);  Surgeon: Arthur Alaniz DO;  Location[facility]     GYN SURGERY      complete hyst     Laproscopic-assisted resection of right colon neoplasm  2012     TONSILLECTOMY  1955       Social History     Tobacco Use     Smoking status: Former Smoker     Packs/day: 1.00     Years: 25.00     Pack years: 25.00     Types: Cigarettes     Smokeless tobacco: Never Used     Tobacco comment: year quit 1989   Substance Use Topics     Alcohol use: Yes     Comment: 1 beer daily. last drink, last night.     Family History   Problem Relation Age of Onset     Other - See Comments Father         (82 Diagnosis); cause of death     C.A.D. Father      Cancer Mother 38        pancreatic         Current Outpatient Medications   Medication Sig Dispense Refill     Ascorbic Acid (VITAMIN C) 500 MG CAPS Take  by mouth. 1 cap daily       aspirin 81 MG EC tablet Take 1 tablet (81 mg) by mouth daily (Patient taking differently: Take 81 mg by mouth three times a week ) 90 tablet 3     meclizine (ANTIVERT) 25 MG tablet Take 1 tablet (25 mg) by mouth 3 times daily as needed for dizziness 30 tablet 1     Multiple Vitamins-Iron (MULTIVITAMIN/IRON PO) Take by mouth daily 1 daily       pravastatin (PRAVACHOL) 20 MG tablet Take 1 tablet (20 mg) by mouth daily 90 tablet 1     VITAMIN E 1 tab daily       estradiol (ESTRACE) 0.1 MG/GM vaginal cream Place 1 g vaginally daily Massage a pea size amount into the vaginal tissues daily. (Patient not taking: Reported on 8/21/2019) 42.5 g 0     Allergies   Allergen Reactions     Fosamax [Alendronic Acid] Cramps  "    Muscle cramps     Recent Labs   Lab Test 05/17/19  0952 05/16/18  1042 05/10/17  0955   LDL 81 81 77   HDL 65 66 74   TRIG 110 151* 114   ALT 41 51* 31   CR 0.86 0.90 0.90   GFRESTIMATED 68 62 62   GFRESTBLACK 79 75 75   POTASSIUM 4.5 3.9 3.7   TSH 1.33 1.12 0.97      BP Readings from Last 3 Encounters:   05/17/19 104/64   01/17/19 120/72   12/12/18 112/62    Wt Readings from Last 3 Encounters:   08/21/19 69.4 kg (153 lb)   05/17/19 69.9 kg (154 lb)   01/17/19 67.1 kg (148 lb)                 Reviewed and updated as needed this visit by Provider         Review of Systems   ROS COMP: Constitutional, HEENT, cardiovascular, pulmonary, gi and gu systems are negative, except as otherwise noted.      Objective    Temp 97.2  F (36.2  C) (Tympanic)   Ht 1.664 m (5' 5.5\")   Wt 69.4 kg (153 lb)   SpO2 99%   BMI 25.07 kg/m    Body mass index is 25.07 kg/m .       Physical Exam   GENERAL: healthy, alert and no distress  HENT: both ears: clear effusion  NECK: no adenopathy, no asymmetry, masses, or scars and thyroid normal to palpation  RESP: lungs clear to auscultation - no rales, rhonchi or wheezes  CV: regular rate and rhythm, normal S1 S2, no S3 or S4, no murmur, click or rub, no peripheral edema and peripheral pulses strong  ABDOMEN: soft, nontender, no hepatosplenomegaly, no masses and bowel sounds normal    Diagnostic Test Results:  Results for orders placed or performed in visit on 08/21/19 (from the past 24 hour(s))   CBC with platelets differential   Result Value Ref Range    WBC 5.4 4.0 - 11.0 10e9/L    RBC Count 4.07 3.8 - 5.2 10e12/L    Hemoglobin 13.5 11.7 - 15.7 g/dL    Hematocrit 39.0 35.0 - 47.0 %    MCV 96 78 - 100 fl    MCH 33.2 (H) 26.5 - 33.0 pg    MCHC 34.6 31.5 - 36.5 g/dL    RDW 12.4 10.0 - 15.0 %    Platelet Count 217 150 - 450 10e9/L    % Neutrophils 48.4 %    % Lymphocytes 32.9 %    % Monocytes 16.2 %    % Eosinophils 1.9 %    % Basophils 0.6 %    Absolute Neutrophil 2.6 1.6 - 8.3 10e9/L    " Absolute Lymphocytes 1.8 0.8 - 5.3 10e9/L    Absolute Monocytes 0.9 0.0 - 1.3 10e9/L    Absolute Eosinophils 0.1 0.0 - 0.7 10e9/L    Absolute Basophils 0.0 0.0 - 0.2 10e9/L    Diff Method Automated Method          EKG normal    While supine, vertigo occurs with turning her head to the left        Assessment & Plan     1. Dizziness  - CBC with platelets differential  - EKG 12-lead complete w/read - (Clinic Performed)    2. Labyrinthitis, unspecified laterality  - See below    3. Benign paroxysmal positional vertigo, right  - meclizine (ANTIVERT) 25 MG tablet; Take 1 tablet (25 mg) by mouth 3 times daily as needed for dizziness  Dispense: 30 tablet; Refill: 1      Epley Maneuver        Return if symptoms worsen or fail to improve.      Irma Buchanan NP  [facility]

## 2019-08-21 ENCOUNTER — OFFICE VISIT (OUTPATIENT)
Dept: FAMILY MEDICINE | Facility: OTHER | Age: 69
End: 2019-08-21
Attending: NURSE PRACTITIONER
Payer: COMMERCIAL

## 2019-08-21 VITALS — HEIGHT: 66 IN | WEIGHT: 153 LBS | OXYGEN SATURATION: 99 % | TEMPERATURE: 97.2 F | BODY MASS INDEX: 24.59 KG/M2

## 2019-08-21 DIAGNOSIS — R42 DIZZINESS: Primary | ICD-10-CM

## 2019-08-21 DIAGNOSIS — H83.09 LABYRINTHITIS, UNSPECIFIED LATERALITY: ICD-10-CM

## 2019-08-21 DIAGNOSIS — H81.11 BENIGN PAROXYSMAL POSITIONAL VERTIGO, RIGHT: ICD-10-CM

## 2019-08-21 LAB
BASOPHILS # BLD AUTO: 0 10E9/L (ref 0–0.2)
BASOPHILS NFR BLD AUTO: 0.6 %
DIFFERENTIAL METHOD BLD: ABNORMAL
EOSINOPHIL # BLD AUTO: 0.1 10E9/L (ref 0–0.7)
EOSINOPHIL NFR BLD AUTO: 1.9 %
ERYTHROCYTE [DISTWIDTH] IN BLOOD BY AUTOMATED COUNT: 12.4 % (ref 10–15)
HCT VFR BLD AUTO: 39 % (ref 35–47)
HGB BLD-MCNC: 13.5 G/DL (ref 11.7–15.7)
LYMPHOCYTES # BLD AUTO: 1.8 10E9/L (ref 0.8–5.3)
LYMPHOCYTES NFR BLD AUTO: 32.9 %
MCH RBC QN AUTO: 33.2 PG (ref 26.5–33)
MCHC RBC AUTO-ENTMCNC: 34.6 G/DL (ref 31.5–36.5)
MCV RBC AUTO: 96 FL (ref 78–100)
MONOCYTES # BLD AUTO: 0.9 10E9/L (ref 0–1.3)
MONOCYTES NFR BLD AUTO: 16.2 %
NEUTROPHILS # BLD AUTO: 2.6 10E9/L (ref 1.6–8.3)
NEUTROPHILS NFR BLD AUTO: 48.4 %
PLATELET # BLD AUTO: 217 10E9/L (ref 150–450)
RBC # BLD AUTO: 4.07 10E12/L (ref 3.8–5.2)
WBC # BLD AUTO: 5.4 10E9/L (ref 4–11)

## 2019-08-21 PROCEDURE — G0463 HOSPITAL OUTPT CLINIC VISIT: HCPCS

## 2019-08-21 PROCEDURE — 85025 COMPLETE CBC W/AUTO DIFF WBC: CPT | Mod: ZL | Performed by: NURSE PRACTITIONER

## 2019-08-21 PROCEDURE — 93005 ELECTROCARDIOGRAM TRACING: CPT

## 2019-08-21 PROCEDURE — 93010 ELECTROCARDIOGRAM REPORT: CPT | Performed by: INTERNAL MEDICINE

## 2019-08-21 PROCEDURE — 36415 COLL VENOUS BLD VENIPUNCTURE: CPT | Mod: ZL | Performed by: NURSE PRACTITIONER

## 2019-08-21 PROCEDURE — 99214 OFFICE O/P EST MOD 30 MIN: CPT | Mod: 25 | Performed by: NURSE PRACTITIONER

## 2019-08-21 RX ORDER — MECLIZINE HYDROCHLORIDE 25 MG/1
25 TABLET ORAL 3 TIMES DAILY PRN
Qty: 30 TABLET | Refills: 1 | Status: SHIPPED | OUTPATIENT
Start: 2019-08-21 | End: 2021-03-30

## 2019-08-21 ASSESSMENT — MIFFLIN-ST. JEOR: SCORE: 1227.81

## 2019-08-21 ASSESSMENT — PAIN SCALES - GENERAL: PAINLEVEL: NO PAIN (0)

## 2019-08-21 NOTE — PATIENT INSTRUCTIONS
Assessment & Plan     1. Dizziness  - CBC with platelets differential  - EKG 12-lead complete w/read - (Clinic Performed)    2. Labyrinthitis, unspecified laterality  - See below    3. Benign paroxysmal positional vertigo, right  - meclizine (ANTIVERT) 25 MG tablet; Take 1 tablet (25 mg) by mouth 3 times daily as needed for dizziness  Dispense: 30 tablet; Refill: 1      Epley Maneuver        Return if symptoms worsen or fail to improve.      Irma Buchanan NP  [facility] [place] [facility]

## 2019-12-10 ENCOUNTER — TELEPHONE (OUTPATIENT)
Dept: FAMILY MEDICINE | Facility: OTHER | Age: 69
End: 2019-12-10

## 2019-12-10 DIAGNOSIS — Z79.899 TAKING A STATIN MEDICATION: ICD-10-CM

## 2019-12-10 DIAGNOSIS — E78.5 HYPERLIPIDEMIA LDL GOAL <100: Primary | ICD-10-CM

## 2019-12-10 NOTE — TELEPHONE ENCOUNTER
10:30 AM    Reason for Call: Phone Call    Description: patient would like a lab ordered for cholesterol, patient is coming Thursday morning to [facility] and would like to get their blood drawn then    Was an appointment offered for this call? No  If yes : Appointment type              Date    Preferred method for responding to this message: Telephone Call  What is your phone number ? [phone]    If we cannot reach you directly, may we leave a detailed response at the number you provided? Yes    Can this message wait until your PCP/provider returns, if available today? YES, No, Not applicable    Mar Duff

## 2019-12-12 DIAGNOSIS — E78.5 HYPERLIPIDEMIA LDL GOAL <100: ICD-10-CM

## 2019-12-12 DIAGNOSIS — Z79.899 TAKING A STATIN MEDICATION: ICD-10-CM

## 2019-12-12 LAB
ALBUMIN SERPL-MCNC: 4.2 G/DL (ref 3.4–5)
ALBUMIN UR-MCNC: NEGATIVE MG/DL
ALP SERPL-CCNC: 51 U/L (ref 40–150)
ALT SERPL W P-5'-P-CCNC: 52 U/L (ref 0–50)
ANION GAP SERPL CALCULATED.3IONS-SCNC: 8 MMOL/L (ref 3–14)
APPEARANCE UR: CLEAR
AST SERPL W P-5'-P-CCNC: 35 U/L (ref 0–45)
BILIRUB SERPL-MCNC: 1.1 MG/DL (ref 0.2–1.3)
BILIRUB UR QL STRIP: NEGATIVE
BUN SERPL-MCNC: 18 MG/DL (ref 7–30)
CALCIUM SERPL-MCNC: 9.4 MG/DL (ref 8.5–10.1)
CHLORIDE SERPL-SCNC: 106 MMOL/L (ref 94–109)
CHOLEST SERPL-MCNC: 201 MG/DL
CO2 SERPL-SCNC: 25 MMOL/L (ref 20–32)
COLOR UR AUTO: YELLOW
CREAT SERPL-MCNC: 0.94 MG/DL (ref 0.52–1.04)
GFR SERPL CREATININE-BSD FRML MDRD: 62 ML/MIN/{1.73_M2}
GLUCOSE SERPL-MCNC: 100 MG/DL (ref 70–99)
GLUCOSE UR STRIP-MCNC: NEGATIVE MG/DL
HDLC SERPL-MCNC: 60 MG/DL
HGB UR QL STRIP: NEGATIVE
KETONES UR STRIP-MCNC: NEGATIVE MG/DL
LDLC SERPL CALC-MCNC: 111 MG/DL
LEUKOCYTE ESTERASE UR QL STRIP: NEGATIVE
NITRATE UR QL: NEGATIVE
NONHDLC SERPL-MCNC: 141 MG/DL
PH UR STRIP: 5.5 PH (ref 5–7)
POTASSIUM SERPL-SCNC: 4.5 MMOL/L (ref 3.4–5.3)
PROT SERPL-MCNC: 7.5 G/DL (ref 6.8–8.8)
SODIUM SERPL-SCNC: 139 MMOL/L (ref 133–144)
SOURCE: NORMAL
SP GR UR STRIP: 1.02 (ref 1–1.03)
TRIGL SERPL-MCNC: 150 MG/DL
TSH SERPL DL<=0.005 MIU/L-ACNC: 1.35 MU/L (ref 0.4–4)
UROBILINOGEN UR STRIP-ACNC: 0.2 EU/DL (ref 0.2–1)

## 2019-12-12 PROCEDURE — 80061 LIPID PANEL: CPT | Mod: ZL | Performed by: NURSE PRACTITIONER

## 2019-12-12 PROCEDURE — 80053 COMPREHEN METABOLIC PANEL: CPT | Mod: ZL | Performed by: NURSE PRACTITIONER

## 2019-12-12 PROCEDURE — 84443 ASSAY THYROID STIM HORMONE: CPT | Mod: ZL | Performed by: NURSE PRACTITIONER

## 2019-12-12 PROCEDURE — 81003 URINALYSIS AUTO W/O SCOPE: CPT | Mod: ZL | Performed by: NURSE PRACTITIONER

## 2019-12-12 PROCEDURE — 36415 COLL VENOUS BLD VENIPUNCTURE: CPT | Mod: ZL | Performed by: NURSE PRACTITIONER

## 2019-12-13 NOTE — RESULT ENCOUNTER NOTE
Lipids are up, which elevates her cardiac vascular disease risk score  Low fat diet  Fish oil 3 per day  Recommend increasing her Pravachol to 40 mg daily from 20 mg  If willing, pls let me know.      The 10-year ASCVD risk score (Facundo PELLETIER Jr., et al., 2013) is: 13.5%    Values used to calculate the score:      Age: 69 years      Sex: Female      Is Non- : No      Diabetic: No      Tobacco smoker: No      Systolic Blood Pressure: 156 mmHg      Is BP treated: No      HDL Cholesterol: 60 mg/dL      Total Cholesterol: 201 mg/dL    Irma BANKSSt. [facility]  [phone]

## 2020-03-02 ENCOUNTER — HEALTH MAINTENANCE LETTER (OUTPATIENT)
Age: 70
End: 2020-03-02

## 2020-07-14 ENCOUNTER — ANCILLARY PROCEDURE (OUTPATIENT)
Dept: MAMMOGRAPHY | Facility: OTHER | Age: 70
End: 2020-07-14
Attending: NURSE PRACTITIONER
Payer: COMMERCIAL

## 2020-07-14 DIAGNOSIS — Z12.31 VISIT FOR SCREENING MAMMOGRAM: ICD-10-CM

## 2020-07-14 PROCEDURE — 77067 SCR MAMMO BI INCL CAD: CPT | Mod: TC

## 2020-07-24 NOTE — PATIENT INSTRUCTIONS
ASSESSMENT/PLAN:   1. Routine general medical examination at a health care facility  - Annual physical 1 year    2. Hyperlipidemia LDL goal <100  - Lipid Profile (Chol, Trig, HDL, LDL calc)  - Comprehensive metabolic panel  - TSH with free T4 reflex  - pravastatin (PRAVACHOL) 20 MG tablet; Take 1 tablet (20 mg) by mouth daily  Dispense: 90 tablet; Refill: 3    3. Taking a statin medication  - Comprehensive metabolic panel    4. Gastroesophageal reflux disease with esophagitis  - Follow-up if needed          Preventive Health Recommendations    See your health care provider every year to    Review health changes.     Discuss preventive care.      Review your medicines if your doctor has prescribed any.      You no longer need a yearly Pap test unless you've had an abnormal Pap test in the past 10 years. If you have vaginal symptoms, such as bleeding or discharge, be sure to talk with your provider about a Pap test.      Every 1 to 2 years, have a mammogram.  If you are over 69, talk with your health care provider about whether or not you want to continue having screening mammograms.      Every 10 years, have a colonoscopy. Or, have a yearly FIT test (stool test). These exams will check for colon cancer.       Have a cholesterol test every 5 years, or more often if your doctor advises it.       Have a diabetes test (fasting glucose) every three years. If you are at risk for diabetes, you should have this test more often.       At age 65, have a bone density scan (DEXA) to check for osteoporosis (brittle bone disease).    Shots:    Get a flu shot each year.    Get a tetanus shot every 10 years.    Talk to your doctor about your pneumonia vaccines. There are now two you should receive - Pneumovax (PPSV 23) and Prevnar (PCV 13).    Talk to your pharmacist about the shingles vaccine.    Talk to your doctor about the hepatitis B vaccine.    Nutrition:     Eat at least 5 servings of fruits and vegetables each  day.      Eat whole-grain bread, whole-wheat pasta and brown rice instead of white grains and rice.      Get adequate about Calcium and Vitamin D.     Lifestyle    Exercise at least 150 minutes a week (30 minutes a day, 5 days a week). This will help you control your weight and prevent disease.      Limit alcohol to one drink per day.      No smoking.       Wear sunscreen to prevent skin cancer.       See your dentist twice a year for an exam and cleaning.      See your eye doctor every 1 to 2 years to screen for conditions such as glaucoma, macular degeneration, cataracts, etc.    Personalized Prevention Plan  You are due for the preventive services outlined below.  Your care team is available to assist you in scheduling these services.  If you have already completed any of these items, please share that information with your care team to update in your medical record.    Health Maintenance Due   Topic Date Due     Diptheria Tetanus Pertussis (DTAP/TDAP/TD) Vaccine (1 - Tdap) 08/22/1974     Zoster (Shingles) Vaccine (1 of 2) 08/22/1999     Pneumococcal Vaccine (1 of 2 - PCV13) 08/22/2014     Anxiety Assessment  11/13/2019     Depression Assessment  11/13/2019     Colorectal Cancer Screening  12/18/2019     PHQ-2  01/01/2020     Annual Wellness Visit  05/17/2020     FALL RISK ASSESSMENT  08/21/2020

## 2020-07-24 NOTE — PROGRESS NOTES
SUBJECTIVE:   CC: Farida Baldwin is an 69 year old woman who presents for preventive health visit.         Healthy Habits:    Do you get at least three servings of calcium containing foods daily (dairy, green leafy vegetables, etc.)? yes    Amount of exercise or daily activities, outside of work: 7 day(s) per week    Problems taking medications regularly No    Medication side effects: No    Have you had an eye exam in the past two years? yes    Do you see a dentist twice per year? yes    Do you have sleep apnea, excessive snoring or daytime drowsiness?no        GERD/Heartburn    Duration: years    Description (location/character/radiation): occasional reflux    Intensity:  mild    Accompanying signs and symptoms:  food getting stuck: no   nausea/vomiting/blood: no   abdominal pain: no   black/tarry or bloody stools: no :    History (similar episodes/previous evaluation): hx    Precipitating or alleviating factors:  worse with no particular food or drink.  current NSAID/Aspirin use: no     Therapies tried and outcome: none        Hyperlipidemia Follow-Up    Are you regularly taking any medication or supplement to lower your cholesterol?   Yes- pravastatin    Are you having muscle aches or other side effects that you think could be caused by your cholesterol lowering medication?  No        Today's PHQ-2 Score:   PHQ-2 ( 1999 Pfizer) 7/29/2020 12/14/2015   Q1: Little interest or pleasure in doing things 0 0   Q2: Feeling down, depressed or hopeless 0 0   PHQ-2 Score 0 0           Left hip pain, soreness - particularly if she lays on her left side        Abuse: Current or Past(Physical, Sexual or Emotional)- No  Do you feel safe in your environment? Yes        Social History     Tobacco Use     Smoking status: Former Smoker     Packs/day: 1.00     Years: 25.00     Pack years: 25.00     Types: Cigarettes     Smokeless tobacco: Never Used     Tobacco comment: year quit 1989   Substance Use Topics     Alcohol use: Yes      Comment: 1 beer daily. last drink, last night.       If you drink alcohol do you typically have >3 drinks per day or >7 drinks per week? No                       Reviewed orders with patient.  Reviewed health maintenance and updated orders accordingly - Yes  Lab work is in process  Labs reviewed in EPIC  BP Readings from Last 3 Encounters:   07/29/20 130/60   05/17/19 104/64   01/17/19 120/72    Wt Readings from Last 3 Encounters:   07/29/20 67.6 kg (149 lb)   08/21/19 69.4 kg (153 lb)   05/17/19 69.9 kg (154 lb)                  Patient Active Problem List   Diagnosis     Hyperlipidemia LDL goal <100     Advanced care planning/counseling discussion     Osteopenia     Gastroesophageal reflux disease with esophagitis     History of colonic polyps - tubular adenoma - colonoscopy every 2 years     Taking a statin medication     Past Surgical History:   Procedure Laterality Date     APPENDECTOMY  1956     BIOPSY      polypectomy-benign     COLONOSCOPY  5/17/2012     COLONOSCOPY  5/30/2013    Procedure: COLONOSCOPY;  COLONOSCOPY WITH BIOPSIES;  Surgeon: Tana Benjamin MD;  [facility]     COLONOSCOPY  11/12/2013    Procedure: COLONOSCOPY;  WHOLE COLON COLONOSCOPY WITH POLYPECTOMY;  Surgeon: Tana Benjamin MD;  [facility]     COLONOSCOPY N/A 11/23/2015    Procedure: COLONOSCOPY;  Surgeon: Tana Benjamin MD;  [facility]     COLONOSCOPY N/A 12/13/2017    Procedure: COLONOSCOPY;  COLONOSCOPY WITH POLYPECTOMY;  Surgeon: Arthur Alaniz DO;  [facility]     ESOPHAGOSCOPY, GASTROSCOPY, DUODENOSCOPY (EGD), COMBINED N/A 2/12/2016    Procedure: COMBINED ESOPHAGOSCOPY, GASTROSCOPY, DUODENOSCOPY (EGD);  Surgeon: Arthur Alaniz DO;  Location[facility]     GYN SURGERY      complete hyst     Laproscopic-assisted resection of right colon neoplasm  2012     TONSILLECTOMY  1955       Social History     Tobacco Use     Smoking status: Former Smoker     Packs/day: 1.00     Years: 25.00     Pack years:  25.00     Types: Cigarettes     Smokeless tobacco: Never Used     Tobacco comment: year quit 1989   Substance Use Topics     Alcohol use: Yes     Comment: 1 beer daily. last drink, last night.     Family History   Problem Relation Age of Onset     Other - See Comments Father         (82 Diagnosis); cause of death     C.A.D. Father      Cancer Mother 38        pancreatic             Current Outpatient Medications   Medication Sig Dispense Refill     Ascorbic Acid (VITAMIN C) 500 MG CAPS Take  by mouth. 1 cap daily       aspirin 81 MG EC tablet Take 1 tablet (81 mg) by mouth daily (Patient taking differently: Take 81 mg by mouth three times a week ) 90 tablet 3     MAGNESIUM GLUCONATE PO Take 400 mg by mouth 2 times daily       pravastatin (PRAVACHOL) 20 MG tablet Take 1 tablet by mouth once daily 90 tablet 0     VITAMIN E 1 tab daily       estradiol (ESTRACE) 0.1 MG/GM vaginal cream Place 1 g vaginally daily Massage a pea size amount into the vaginal tissues daily. (Patient not taking: Reported on 8/21/2019) 42.5 g 0     meclizine (ANTIVERT) 25 MG tablet Take 1 tablet (25 mg) by mouth 3 times daily as needed for dizziness (Patient not taking: Reported on 7/29/2020) 30 tablet 1     Multiple Vitamins-Iron (MULTIVITAMIN/IRON PO) Take by mouth daily 1 daily           Allergies   Allergen Reactions     Fosamax [Alendronic Acid] Cramps     Muscle cramps         Recent Labs   Lab Test 12/12/19  1110 05/17/19  0952 05/16/18  1042   * 81 81   HDL 60 65 66   TRIG 150* 110 151*   ALT 52* 41 51*   CR 0.94 0.86 0.90   GFRESTIMATED 62 68 62   GFRESTBLACK 71 79 75   POTASSIUM 4.5 4.5 3.9   TSH 1.35 1.33 1.12        Mammo UTD    Pertinent mammograms are reviewed under the imaging tab.  History of abnormal Pap smear: No     Reviewed and updated as needed this visit by clinical staff  Tobacco  Allergies         Reviewed and updated as needed this visit by Provider        Past Medical History:   Diagnosis Date      Gastroesophageal reflux disease with esophagitis 1/27/2016     Hyperlipidemia LDL goal < 100 9/7/2012     Osteopenia 4/25/2014     Taking a statin medication 5/10/2017     Tubulovillous adenoma polyp of colon 9/7/2012      Past Surgical History:   Procedure Laterality Date     APPENDECTOMY  1956     BIOPSY      polypectomy-benign     COLONOSCOPY  5/17/2012     COLONOSCOPY  5/30/2013    Procedure: COLONOSCOPY;  COLONOSCOPY WITH BIOPSIES;  Surgeon: Tana Benjamin MD;  [facility]     COLONOSCOPY  11/12/2013    Procedure: COLONOSCOPY;  WHOLE COLON COLONOSCOPY WITH POLYPECTOMY;  Surgeon: Tana Benjamin MD;  [facility]     COLONOSCOPY N/A 11/23/2015    Procedure: COLONOSCOPY;  Surgeon: Tana Benjamin MD;  [facility]     COLONOSCOPY N/A 12/13/2017    Procedure: COLONOSCOPY;  COLONOSCOPY WITH POLYPECTOMY;  Surgeon: Arthur Alaniz DO;  [facility]     ESOPHAGOSCOPY, GASTROSCOPY, DUODENOSCOPY (EGD), COMBINED N/A 2/12/2016    Procedure: COMBINED ESOPHAGOSCOPY, GASTROSCOPY, DUODENOSCOPY (EGD);  Surgeon: Arthur Alaniz DO;  [facility]     GYN SURGERY      complete hyst     Laproscopic-assisted resection of right colon neoplasm  2012     TONSILLECTOMY  1955     OB History   No obstetric history on file.       ROS:  CONSTITUTIONAL: NEGATIVE for fever, chills, change in weight  INTEGUMENTARY/SKIN: NEGATIVE for worrisome rashes, moles or lesions  EYES: NEGATIVE for vision changes or irritation  ENT: NEGATIVE for ear, mouth and throat problems  RESP: NEGATIVE for significant cough or SOB  CV: NEGATIVE for chest pain, palpitations or peripheral edema  GI: NEGATIVE for nausea, abdominal pain, heartburn, or change in bowel habits  : NEGATIVE for unusual urinary or vaginal symptoms. No vaginal bleeding.  MUSCULOSKELETAL: NEGATIVE for significant arthralgias or myalgia  NEURO: NEGATIVE for weakness, dizziness or paresthesias  PSYCHIATRIC: NEGATIVE for changes in mood or affect     OBJECTIVE:  "  /60 (BP Location: Right arm, Patient Position: Chair, Cuff Size: Adult Regular)   Pulse 62   Ht 1.676 m (5' 6\")   Wt 67.6 kg (149 lb)   SpO2 98%   BMI 24.05 kg/m         EXAM:  GENERAL: healthy, alert and no distress  EYES: Eyes grossly normal to inspection, PERRL and conjunctivae and sclerae normal  NECK: no adenopathy, no asymmetry, masses, or scars and thyroid normal to palpation  RESP: lungs clear to auscultation - no rales, rhonchi or wheezes  CV: regular rate and rhythm, normal S1 S2, no S3 or S4, no murmur, click or rub, no peripheral edema and peripheral pulses strong  ABDOMEN: soft, nontender, no hepatosplenomegaly, no masses and bowel sounds normal  MS: no gross musculoskeletal defects noted, no edema  SKIN: no suspicious lesions or rashes  PSYCH: mentation appears normal, affect normal/bright        ASSESSMENT/PLAN:   1. Routine general medical examination at a health care facility  - Annual physical 1 year    2. Hyperlipidemia LDL goal <100  - Lipid Profile (Chol, Trig, HDL, LDL calc)  - Comprehensive metabolic panel  - TSH with free T4 reflex  - pravastatin (PRAVACHOL) 20 MG tablet; Take 1 tablet (20 mg) by mouth daily  Dispense: 90 tablet; Refill: 3    3. Taking a statin medication  - Comprehensive metabolic panel    4. Gastroesophageal reflux disease with esophagitis  - Follow-up if needed          COUNSELING:   Reviewed preventive health counseling, as reflected in patient instructions       Regular exercise       Healthy diet/nutrition       Vision screening    Estimated body mass index is 24.05 kg/m  as calculated from the following:    Height as of this encounter: 1.676 m (5' 6\").    Weight as of this encounter: 67.6 kg (149 lb).         reports that she has quit smoking. Her smoking use included cigarettes. She has a 25.00 pack-year smoking history. She has never used smokeless tobacco.      Counseling Resources:  ATP IV Guidelines  Pooled Cohorts Equation Calculator  Breast Cancer " Risk Calculator  FRAX Risk Assessment  ICSI Preventive Guidelines  Dietary Guidelines for Americans, 2010  USDA's MyPlate  ASA Prophylaxis  Lung CA Screening    Irma Buchanan, JAGDISH  [facility]

## 2020-07-29 ENCOUNTER — OFFICE VISIT (OUTPATIENT)
Dept: FAMILY MEDICINE | Facility: OTHER | Age: 70
End: 2020-07-29
Attending: NURSE PRACTITIONER
Payer: COMMERCIAL

## 2020-07-29 VITALS
SYSTOLIC BLOOD PRESSURE: 130 MMHG | DIASTOLIC BLOOD PRESSURE: 60 MMHG | HEIGHT: 66 IN | BODY MASS INDEX: 23.95 KG/M2 | WEIGHT: 149 LBS | OXYGEN SATURATION: 98 % | HEART RATE: 62 BPM

## 2020-07-29 DIAGNOSIS — Z00.00 ROUTINE GENERAL MEDICAL EXAMINATION AT A HEALTH CARE FACILITY: Primary | ICD-10-CM

## 2020-07-29 DIAGNOSIS — E78.5 HYPERLIPIDEMIA LDL GOAL <100: ICD-10-CM

## 2020-07-29 DIAGNOSIS — K21.00 GASTROESOPHAGEAL REFLUX DISEASE WITH ESOPHAGITIS: ICD-10-CM

## 2020-07-29 DIAGNOSIS — Z79.899 TAKING A STATIN MEDICATION: ICD-10-CM

## 2020-07-29 LAB
ALBUMIN SERPL-MCNC: 4.3 G/DL (ref 3.4–5)
ALP SERPL-CCNC: 49 U/L (ref 40–150)
ALT SERPL W P-5'-P-CCNC: 36 U/L (ref 0–50)
ANION GAP SERPL CALCULATED.3IONS-SCNC: 7 MMOL/L (ref 3–14)
AST SERPL W P-5'-P-CCNC: 24 U/L (ref 0–45)
BILIRUB SERPL-MCNC: 1.1 MG/DL (ref 0.2–1.3)
BUN SERPL-MCNC: 16 MG/DL (ref 7–30)
CALCIUM SERPL-MCNC: 9.2 MG/DL (ref 8.5–10.1)
CHLORIDE SERPL-SCNC: 107 MMOL/L (ref 94–109)
CHOLEST SERPL-MCNC: 172 MG/DL
CO2 SERPL-SCNC: 26 MMOL/L (ref 20–32)
CREAT SERPL-MCNC: 0.85 MG/DL (ref 0.52–1.04)
GFR SERPL CREATININE-BSD FRML MDRD: 69 ML/MIN/{1.73_M2}
GLUCOSE SERPL-MCNC: 110 MG/DL (ref 70–99)
HDLC SERPL-MCNC: 71 MG/DL
LDLC SERPL CALC-MCNC: 82 MG/DL
NONHDLC SERPL-MCNC: 101 MG/DL
POTASSIUM SERPL-SCNC: 4.4 MMOL/L (ref 3.4–5.3)
PROT SERPL-MCNC: 7.6 G/DL (ref 6.8–8.8)
SODIUM SERPL-SCNC: 140 MMOL/L (ref 133–144)
TRIGL SERPL-MCNC: 94 MG/DL
TSH SERPL DL<=0.005 MIU/L-ACNC: 0.66 MU/L (ref 0.4–4)

## 2020-07-29 PROCEDURE — 80061 LIPID PANEL: CPT | Mod: ZL | Performed by: NURSE PRACTITIONER

## 2020-07-29 PROCEDURE — 36415 COLL VENOUS BLD VENIPUNCTURE: CPT | Mod: ZL | Performed by: NURSE PRACTITIONER

## 2020-07-29 PROCEDURE — 99397 PER PM REEVAL EST PAT 65+ YR: CPT | Performed by: NURSE PRACTITIONER

## 2020-07-29 PROCEDURE — 80053 COMPREHEN METABOLIC PANEL: CPT | Mod: ZL | Performed by: NURSE PRACTITIONER

## 2020-07-29 PROCEDURE — 84443 ASSAY THYROID STIM HORMONE: CPT | Mod: ZL | Performed by: NURSE PRACTITIONER

## 2020-07-29 RX ORDER — PRAVASTATIN SODIUM 20 MG
20 TABLET ORAL DAILY
Qty: 90 TABLET | Refills: 3 | Status: SHIPPED | OUTPATIENT
Start: 2020-07-29 | End: 2021-01-01

## 2020-07-29 ASSESSMENT — ANXIETY QUESTIONNAIRES
5. BEING SO RESTLESS THAT IT IS HARD TO SIT STILL: NOT AT ALL
6. BECOMING EASILY ANNOYED OR IRRITABLE: SEVERAL DAYS
IF YOU CHECKED OFF ANY PROBLEMS ON THIS QUESTIONNAIRE, HOW DIFFICULT HAVE THESE PROBLEMS MADE IT FOR YOU TO DO YOUR WORK, TAKE CARE OF THINGS AT HOME, OR GET ALONG WITH OTHER PEOPLE: NOT DIFFICULT AT ALL
GAD7 TOTAL SCORE: 2
7. FEELING AFRAID AS IF SOMETHING AWFUL MIGHT HAPPEN: NOT AT ALL
3. WORRYING TOO MUCH ABOUT DIFFERENT THINGS: NOT AT ALL
4. TROUBLE RELAXING: NOT AT ALL
2. NOT BEING ABLE TO STOP OR CONTROL WORRYING: NOT AT ALL
1. FEELING NERVOUS, ANXIOUS, OR ON EDGE: SEVERAL DAYS

## 2020-07-29 ASSESSMENT — PAIN SCALES - GENERAL: PAINLEVEL: NO PAIN (0)

## 2020-07-29 ASSESSMENT — PATIENT HEALTH QUESTIONNAIRE - PHQ9: SUM OF ALL RESPONSES TO PHQ QUESTIONS 1-9: 2

## 2020-07-29 ASSESSMENT — MIFFLIN-ST. JEOR: SCORE: 1212.61

## 2020-07-29 NOTE — NURSING NOTE
"Chief Complaint   Patient presents with     Physical       Initial /60 (BP Location: Right arm, Patient Position: Chair, Cuff Size: Adult Regular)   Pulse 62   Ht 1.676 m (5' 6\")   Wt 67.6 kg (149 lb)   SpO2 98%   BMI 24.05 kg/m   Estimated body mass index is 24.05 kg/m  as calculated from the following:    Height as of this encounter: 1.676 m (5' 6\").    Weight as of this encounter: 67.6 kg (149 lb).  Medication Reconciliation: complete  Georgina Burnette LPN    " 0 = independent

## 2020-07-30 ASSESSMENT — ANXIETY QUESTIONNAIRES: GAD7 TOTAL SCORE: 2

## 2020-12-20 ENCOUNTER — HEALTH MAINTENANCE LETTER (OUTPATIENT)
Age: 70
End: 2020-12-20

## 2021-01-01 ENCOUNTER — NURSE TRIAGE (OUTPATIENT)
Dept: FAMILY MEDICINE | Facility: OTHER | Age: 71
End: 2021-01-01

## 2021-01-01 ENCOUNTER — MYC MEDICAL ADVICE (OUTPATIENT)
Dept: FAMILY MEDICINE | Facility: OTHER | Age: 71
End: 2021-01-01

## 2021-01-01 ENCOUNTER — TRANSFERRED RECORDS (OUTPATIENT)
Dept: HEALTH INFORMATION MANAGEMENT | Facility: CLINIC | Age: 71
End: 2021-01-01

## 2021-01-01 ENCOUNTER — TELEPHONE (OUTPATIENT)
Dept: FAMILY MEDICINE | Facility: OTHER | Age: 71
End: 2021-01-01
Payer: COMMERCIAL

## 2021-01-01 ENCOUNTER — OFFICE VISIT (OUTPATIENT)
Dept: OTOLARYNGOLOGY | Facility: OTHER | Age: 71
End: 2021-01-01
Attending: NURSE PRACTITIONER
Payer: COMMERCIAL

## 2021-01-01 ENCOUNTER — TELEPHONE (OUTPATIENT)
Dept: MAMMOGRAPHY | Facility: OTHER | Age: 71
End: 2021-01-01

## 2021-01-01 ENCOUNTER — OFFICE VISIT (OUTPATIENT)
Dept: FAMILY MEDICINE | Facility: OTHER | Age: 71
End: 2021-01-01
Attending: NURSE PRACTITIONER
Payer: COMMERCIAL

## 2021-01-01 ENCOUNTER — HEALTH MAINTENANCE LETTER (OUTPATIENT)
Age: 71
End: 2021-01-01

## 2021-01-01 ENCOUNTER — ANCILLARY PROCEDURE (OUTPATIENT)
Dept: MAMMOGRAPHY | Facility: OTHER | Age: 71
End: 2021-01-01
Attending: NURSE PRACTITIONER
Payer: COMMERCIAL

## 2021-01-01 ENCOUNTER — LAB (OUTPATIENT)
Dept: LAB | Facility: OTHER | Age: 71
End: 2021-01-01
Payer: COMMERCIAL

## 2021-01-01 VITALS
BODY MASS INDEX: 24.3 KG/M2 | RESPIRATION RATE: 18 BRPM | WEIGHT: 146 LBS | HEART RATE: 92 BPM | SYSTOLIC BLOOD PRESSURE: 112 MMHG | DIASTOLIC BLOOD PRESSURE: 52 MMHG | OXYGEN SATURATION: 88 % | TEMPERATURE: 98.8 F

## 2021-01-01 VITALS
TEMPERATURE: 99.9 F | OXYGEN SATURATION: 90 % | SYSTOLIC BLOOD PRESSURE: 110 MMHG | DIASTOLIC BLOOD PRESSURE: 54 MMHG | RESPIRATION RATE: 22 BRPM | WEIGHT: 147 LBS | HEART RATE: 86 BPM | BODY MASS INDEX: 24.46 KG/M2

## 2021-01-01 VITALS
DIASTOLIC BLOOD PRESSURE: 52 MMHG | SYSTOLIC BLOOD PRESSURE: 112 MMHG | BODY MASS INDEX: 24.4 KG/M2 | OXYGEN SATURATION: 88 % | TEMPERATURE: 98.8 F | WEIGHT: 146.6 LBS | RESPIRATION RATE: 18 BRPM | HEART RATE: 92 BPM

## 2021-01-01 VITALS
OXYGEN SATURATION: 96 % | HEART RATE: 82 BPM | HEIGHT: 65 IN | WEIGHT: 150.5 LBS | BODY MASS INDEX: 25.08 KG/M2 | SYSTOLIC BLOOD PRESSURE: 116 MMHG | DIASTOLIC BLOOD PRESSURE: 58 MMHG | RESPIRATION RATE: 16 BRPM | TEMPERATURE: 98.6 F

## 2021-01-01 DIAGNOSIS — Z00.00 ROUTINE GENERAL MEDICAL EXAMINATION AT A HEALTH CARE FACILITY: ICD-10-CM

## 2021-01-01 DIAGNOSIS — E78.5 HYPERLIPIDEMIA LDL GOAL <100: ICD-10-CM

## 2021-01-01 DIAGNOSIS — F41.9 ANXIETY: ICD-10-CM

## 2021-01-01 DIAGNOSIS — Z00.00 ROUTINE GENERAL MEDICAL EXAMINATION AT A HEALTH CARE FACILITY: Primary | ICD-10-CM

## 2021-01-01 DIAGNOSIS — J34.89 NASAL DRYNESS: Primary | ICD-10-CM

## 2021-01-01 DIAGNOSIS — C34.90 ADENOCARCINOMA OF LUNG, UNSPECIFIED LATERALITY (H): ICD-10-CM

## 2021-01-01 DIAGNOSIS — R09.81 NASAL CONGESTION: Primary | ICD-10-CM

## 2021-01-01 DIAGNOSIS — Z12.31 ENCOUNTER FOR SCREENING MAMMOGRAM FOR MALIGNANT NEOPLASM OF BREAST: Primary | ICD-10-CM

## 2021-01-01 DIAGNOSIS — Z12.31 ENCOUNTER FOR SCREENING MAMMOGRAM FOR MALIGNANT NEOPLASM OF BREAST: ICD-10-CM

## 2021-01-01 DIAGNOSIS — Z87.01 HISTORY OF PNEUMONIA: ICD-10-CM

## 2021-01-01 DIAGNOSIS — Z20.822 EXPOSURE TO 2019 NOVEL CORONAVIRUS: Primary | ICD-10-CM

## 2021-01-01 DIAGNOSIS — K21.00 GASTROESOPHAGEAL REFLUX DISEASE WITH ESOPHAGITIS WITHOUT HEMORRHAGE: ICD-10-CM

## 2021-01-01 DIAGNOSIS — J34.89 SINUS PAIN: ICD-10-CM

## 2021-01-01 DIAGNOSIS — J18.9 PNEUMONIA OF RIGHT UPPER LOBE DUE TO INFECTIOUS ORGANISM: Primary | ICD-10-CM

## 2021-01-01 DIAGNOSIS — R06.02 SOB (SHORTNESS OF BREATH): ICD-10-CM

## 2021-01-01 DIAGNOSIS — J34.89 NOSE IRRITATION: ICD-10-CM

## 2021-01-01 LAB
ALBUMIN SERPL-MCNC: 4.2 G/DL (ref 3.4–5)
ALP SERPL-CCNC: 52 U/L (ref 40–150)
ALT SERPL W P-5'-P-CCNC: 31 U/L (ref 0–50)
ALT SERPL-CCNC: 25 U/L (ref 9–41)
ANION GAP SERPL CALCULATED.3IONS-SCNC: 8 MMOL/L (ref 3–14)
AST SERPL W P-5'-P-CCNC: 20 U/L (ref 0–45)
AST SERPL-CCNC: 25 U/L (ref 12–38)
BASOPHILS # BLD AUTO: 0 10E3/UL (ref 0–0.2)
BASOPHILS NFR BLD AUTO: 1 %
BILIRUB SERPL-MCNC: 1.4 MG/DL (ref 0.2–1.3)
BUN SERPL-MCNC: 14 MG/DL (ref 7–30)
CALCIUM SERPL-MCNC: 9.3 MG/DL (ref 8.5–10.1)
CHLORIDE BLD-SCNC: 108 MMOL/L (ref 94–109)
CHOLEST SERPL-MCNC: 181 MG/DL
CO2 SERPL-SCNC: 24 MMOL/L (ref 20–32)
CREAT SERPL-MCNC: 0.94 MG/DL (ref 0.52–1.04)
CREATININE (EXTERNAL): 1 MG/DL (ref 0.7–1.4)
EOSINOPHIL # BLD AUTO: 0.1 10E3/UL (ref 0–0.7)
EOSINOPHIL NFR BLD AUTO: 3 %
ERYTHROCYTE [DISTWIDTH] IN BLOOD BY AUTOMATED COUNT: 12.7 % (ref 10–15)
FASTING STATUS PATIENT QL REPORTED: YES
GFR ESTIMATED (EXTERNAL): 55 ML/MIN/1.73M2
GFR SERPL CREATININE-BSD FRML MDRD: 61 ML/MIN/1.73M2
GLUCOSE (EXTERNAL): 98 MG/DL (ref 64–112)
GLUCOSE BLD-MCNC: 100 MG/DL (ref 70–99)
HCT VFR BLD AUTO: 39.6 % (ref 35–47)
HDLC SERPL-MCNC: 62 MG/DL
HGB BLD-MCNC: 13.9 G/DL (ref 11.7–15.7)
LDLC SERPL CALC-MCNC: 84 MG/DL
LYMPHOCYTES # BLD AUTO: 1.4 10E3/UL (ref 0.8–5.3)
LYMPHOCYTES NFR BLD AUTO: 30 %
MCH RBC QN AUTO: 32.9 PG (ref 26.5–33)
MCHC RBC AUTO-ENTMCNC: 35.1 G/DL (ref 31.5–36.5)
MCV RBC AUTO: 94 FL (ref 78–100)
MONOCYTES # BLD AUTO: 0.8 10E3/UL (ref 0–1.3)
MONOCYTES NFR BLD AUTO: 16 %
NEUTROPHILS # BLD AUTO: 2.4 10E3/UL (ref 1.6–8.3)
NEUTROPHILS NFR BLD AUTO: 51 %
NONHDLC SERPL-MCNC: 119 MG/DL
PLATELET # BLD AUTO: 222 10E3/UL (ref 150–450)
POTASSIUM (EXTERNAL): 4.5 MMOL/L (ref 3.5–5.3)
POTASSIUM BLD-SCNC: 4.1 MMOL/L (ref 3.4–5.3)
PROT SERPL-MCNC: 7.6 G/DL (ref 6.8–8.8)
RBC # BLD AUTO: 4.22 10E6/UL (ref 3.8–5.2)
SODIUM SERPL-SCNC: 140 MMOL/L (ref 133–144)
TRIGL SERPL-MCNC: 173 MG/DL
TSH SERPL DL<=0.005 MIU/L-ACNC: 1.82 MU/L (ref 0.4–4)
WBC # BLD AUTO: 4.8 10E3/UL (ref 4–11)

## 2021-01-01 PROCEDURE — 31231 NASAL ENDOSCOPY DX: CPT | Performed by: NURSE PRACTITIONER

## 2021-01-01 PROCEDURE — 77063 BREAST TOMOSYNTHESIS BI: CPT | Mod: TC

## 2021-01-01 PROCEDURE — 99214 OFFICE O/P EST MOD 30 MIN: CPT | Performed by: NURSE PRACTITIONER

## 2021-01-01 PROCEDURE — 99213 OFFICE O/P EST LOW 20 MIN: CPT | Mod: 25 | Performed by: NURSE PRACTITIONER

## 2021-01-01 PROCEDURE — G0438 PPPS, INITIAL VISIT: HCPCS | Performed by: NURSE PRACTITIONER

## 2021-01-01 PROCEDURE — G0463 HOSPITAL OUTPT CLINIC VISIT: HCPCS

## 2021-01-01 PROCEDURE — 82040 ASSAY OF SERUM ALBUMIN: CPT | Mod: ZL

## 2021-01-01 PROCEDURE — 99213 OFFICE O/P EST LOW 20 MIN: CPT | Performed by: NURSE PRACTITIONER

## 2021-01-01 PROCEDURE — 36415 COLL VENOUS BLD VENIPUNCTURE: CPT | Mod: ZL

## 2021-01-01 PROCEDURE — 84443 ASSAY THYROID STIM HORMONE: CPT | Mod: ZL

## 2021-01-01 PROCEDURE — G0463 HOSPITAL OUTPT CLINIC VISIT: HCPCS | Mod: 25

## 2021-01-01 PROCEDURE — G0463 HOSPITAL OUTPT CLINIC VISIT: HCPCS | Mod: 25,27

## 2021-01-01 PROCEDURE — 80061 LIPID PANEL: CPT | Mod: ZL

## 2021-01-01 PROCEDURE — 85025 COMPLETE CBC W/AUTO DIFF WBC: CPT | Mod: ZL

## 2021-01-01 RX ORDER — PROCHLORPERAZINE MALEATE 10 MG
TABLET ORAL
COMMUNITY
Start: 2021-01-01

## 2021-01-01 RX ORDER — PRAVASTATIN SODIUM 20 MG
TABLET ORAL
Qty: 90 TABLET | Refills: 0 | Status: SHIPPED | OUTPATIENT
Start: 2021-01-01 | End: 2021-01-01

## 2021-01-01 RX ORDER — ALECTINIB HYDROCHLORIDE 150 MG/1
CAPSULE ORAL
COMMUNITY
Start: 2021-01-01

## 2021-01-01 RX ORDER — ALBUTEROL SULFATE 90 UG/1
2 AEROSOL, METERED RESPIRATORY (INHALATION) EVERY 6 HOURS PRN
Qty: 18 G | Refills: 1 | Status: SHIPPED | OUTPATIENT
Start: 2021-01-01

## 2021-01-01 RX ORDER — BENZONATATE 100 MG/1
CAPSULE ORAL
COMMUNITY
Start: 2021-01-01

## 2021-01-01 RX ORDER — PRAVASTATIN SODIUM 20 MG
TABLET ORAL
Qty: 90 TABLET | Refills: 0 | Status: SHIPPED | OUTPATIENT
Start: 2021-01-01 | End: 2022-01-01

## 2021-01-01 RX ORDER — OLANZAPINE 5 MG/1
TABLET ORAL
COMMUNITY
Start: 2021-01-01 | End: 2021-01-01

## 2021-01-01 RX ORDER — LORAZEPAM 0.5 MG/1
0.5 TABLET ORAL EVERY 6 HOURS PRN
Qty: 30 TABLET | Refills: 1 | Status: SHIPPED | OUTPATIENT
Start: 2021-01-01

## 2021-01-01 RX ORDER — DEXAMETHASONE 4 MG/1
TABLET ORAL
COMMUNITY
Start: 2021-01-01 | End: 2021-01-01

## 2021-01-01 ASSESSMENT — PATIENT HEALTH QUESTIONNAIRE - PHQ9
SUM OF ALL RESPONSES TO PHQ QUESTIONS 1-9: 0
5. POOR APPETITE OR OVEREATING: NOT AT ALL

## 2021-01-01 ASSESSMENT — ANXIETY QUESTIONNAIRES
6. BECOMING EASILY ANNOYED OR IRRITABLE: NOT AT ALL
5. BEING SO RESTLESS THAT IT IS HARD TO SIT STILL: NOT AT ALL
GAD7 TOTAL SCORE: 0
GAD7 TOTAL SCORE: 0
2. NOT BEING ABLE TO STOP OR CONTROL WORRYING: NOT AT ALL
3. WORRYING TOO MUCH ABOUT DIFFERENT THINGS: NOT AT ALL
IF YOU CHECKED OFF ANY PROBLEMS ON THIS QUESTIONNAIRE, HOW DIFFICULT HAVE THESE PROBLEMS MADE IT FOR YOU TO DO YOUR WORK, TAKE CARE OF THINGS AT HOME, OR GET ALONG WITH OTHER PEOPLE: NOT DIFFICULT AT ALL
1. FEELING NERVOUS, ANXIOUS, OR ON EDGE: NOT AT ALL
7. FEELING AFRAID AS IF SOMETHING AWFUL MIGHT HAPPEN: NOT AT ALL

## 2021-01-01 ASSESSMENT — PAIN SCALES - GENERAL
PAINLEVEL: NO PAIN (0)
PAINLEVEL: NO PAIN (1)

## 2021-01-01 ASSESSMENT — ACTIVITIES OF DAILY LIVING (ADL)
CURRENT_FUNCTION: NO ASSISTANCE NEEDED
CURRENT_FUNCTION: NO ASSISTANCE NEEDED

## 2021-01-01 ASSESSMENT — MIFFLIN-ST. JEOR: SCORE: 1198.54

## 2021-01-09 ENCOUNTER — MYC MEDICAL ADVICE (OUTPATIENT)
Dept: FAMILY MEDICINE | Facility: OTHER | Age: 71
End: 2021-01-09

## 2021-01-09 DIAGNOSIS — Z12.11 SCREENING FOR MALIGNANT NEOPLASM OF COLON: Primary | ICD-10-CM

## 2021-01-21 ENCOUNTER — TELEPHONE (OUTPATIENT)
Dept: SURGERY | Facility: OTHER | Age: 71
End: 2021-01-21

## 2021-01-21 NOTE — TELEPHONE ENCOUNTER
Referral received for colonoscopy.   This patient was approved by surgery education nurses for meet and greet colonoscopy and will not need a preop appointment.   First attempt to call patient to schedule. No answer. aVleria Reddy LPN

## 2021-01-26 ENCOUNTER — TELEPHONE (OUTPATIENT)
Dept: SURGERY | Facility: OTHER | Age: 71
End: 2021-01-26

## 2021-01-26 NOTE — TELEPHONE ENCOUNTER
"Might not be due. Health Maintenance is flagged for every 2 years; however, on pathology report from last colonoscopy on 12/13/2017 by Dr. Alaniz stated, \"1 small benign tubular adenoma.  Because of her history, repeat colonoscopy in 5 years.\" She was doing every 2 year before this. This is also documented in a phone call encounter to the patient.    Checking with PCP on this.       "

## 2021-01-27 ENCOUNTER — PREP FOR PROCEDURE (OUTPATIENT)
Dept: SURGERY | Facility: OTHER | Age: 71
End: 2021-01-27

## 2021-01-27 ENCOUNTER — TELEPHONE (OUTPATIENT)
Dept: SURGERY | Facility: OTHER | Age: 71
End: 2021-01-27

## 2021-01-27 DIAGNOSIS — Z01.818 PREOP TESTING: Primary | ICD-10-CM

## 2021-01-27 DIAGNOSIS — Z12.11 SCREENING FOR MALIGNANT NEOPLASM OF COLON: Primary | ICD-10-CM

## 2021-01-27 NOTE — TELEPHONE ENCOUNTER
Referral received for colonoscopy.   This patient was approved by surgery education nurses for meet and greet colonoscopy and will not need a preop appointment.   Patient scheduled for colonoscopy on 3/4/21 with Dr. Howard at [facility] with gatorade bowel prep.   Instructions given via phone and instructions mailed to patient with surgery handbook. Valeria Reddy LPN

## 2021-02-26 NOTE — OR NURSING
Pt called wondering what meds she should stop taking prior to her colonscopy on Thurs. Pt Educated

## 2021-02-28 ENCOUNTER — OFFICE VISIT (OUTPATIENT)
Dept: FAMILY MEDICINE | Facility: OTHER | Age: 71
End: 2021-02-28
Attending: NURSE PRACTITIONER
Payer: COMMERCIAL

## 2021-02-28 DIAGNOSIS — Z01.818 PREOP TESTING: ICD-10-CM

## 2021-02-28 LAB
LABORATORY COMMENT REPORT: NORMAL
SARS-COV-2 RNA RESP QL NAA+PROBE: NEGATIVE
SARS-COV-2 RNA RESP QL NAA+PROBE: NORMAL
SPECIMEN SOURCE: NORMAL
SPECIMEN SOURCE: NORMAL

## 2021-02-28 PROCEDURE — 87635 SARS-COV-2 COVID-19 AMP PRB: CPT | Mod: ZL | Performed by: SURGERY

## 2021-03-01 ENCOUNTER — ANESTHESIA EVENT (OUTPATIENT)
Dept: SURGERY | Facility: HOSPITAL | Age: 71
End: 2021-03-01
Payer: COMMERCIAL

## 2021-03-01 ASSESSMENT — LIFESTYLE VARIABLES: TOBACCO_USE: 1

## 2021-03-01 NOTE — ANESTHESIA PREPROCEDURE EVALUATION
Anesthesia Pre-Procedure Evaluation    Patient: Farida Baldwin   MRN: [id number] : 1950        Preoperative Diagnosis: Screening for malignant neoplasm of colon [Z12.11]   Procedure : Procedure(s):  COLONOSCOPY     Past Medical History:   Diagnosis Date     Gastroesophageal reflux disease with esophagitis 2016     Hyperlipidemia LDL goal < 100 2012     Osteopenia 2014     Taking a statin medication 5/10/2017     Tubulovillous adenoma polyp of colon 2012      Past Surgical History:   Procedure Laterality Date     APPENDECTOMY       BIOPSY      polypectomy-benign     COLONOSCOPY  2012     COLONOSCOPY  2013    Procedure: COLONOSCOPY;  COLONOSCOPY WITH BIOPSIES;  Surgeon: Tana Benjamin MD;  [facility]     COLONOSCOPY  2013    Procedure: COLONOSCOPY;  WHOLE COLON COLONOSCOPY WITH POLYPECTOMY;  Surgeon: Tana Benjamin MD;  [facility]     COLONOSCOPY N/A 2015    Procedure: COLONOSCOPY;  Surgeon: Tana Benjamin MD;  [facility]     COLONOSCOPY N/A 2017    Procedure: COLONOSCOPY;  COLONOSCOPY WITH POLYPECTOMY;  Surgeon: Arthur Alaniz DO;  [facility]     ESOPHAGOSCOPY, GASTROSCOPY, DUODENOSCOPY (EGD), COMBINED N/A 2016    Procedure: COMBINED ESOPHAGOSCOPY, GASTROSCOPY, DUODENOSCOPY (EGD);  Surgeon: Arthur Alaniz DO;  [facility]     GYN SURGERY      complete hyst     Laproscopic-assisted resection of right colon neoplasm       TONSILLECTOMY        Allergies   Allergen Reactions     Fosamax [Alendronic Acid] Cramps     Muscle cramps      Social History     Tobacco Use     Smoking status: Former Smoker     Packs/day: 1.00     Years: 25.00     Pack years: 25.00     Types: Cigarettes     Smokeless tobacco: Never Used     Tobacco comment: year quit    Substance Use Topics     Alcohol use: Yes     Comment: 1 beer daily. last drink, last night.      Wt Readings from Last 1 Encounters:   20 67.6 kg (149 lb)         Anesthesia Evaluation   Pt has had prior anesthetic.     No history of anesthetic complications       ROS/MED HX  ENT/Pulmonary:     (+) tobacco use, Past use,     Neurologic:  - neg neurologic ROS     Cardiovascular:     (+) Dyslipidemia -----Previous cardiac testing   Echo: Date: Results:    Stress Test: Date: Results:    ECG Reviewed: Date: 2019 Results:  NSR  Possible atrial enlargement  Cath: Date: Results:      METS/Exercise Tolerance:     Hematologic:  - neg hematologic  ROS     Musculoskeletal:  - neg musculoskeletal ROS     GI/Hepatic: Comment: S/p resection right colon    (+) GERD, bowel prep,     Renal/Genitourinary:  - neg Renal ROS     Endo:  - neg endo ROS     Psychiatric/Substance Use:  - neg psychiatric ROS     Infectious Disease:  - neg infectious disease ROS     Malignancy:   (+) Malignancy, History of Skin and Other.Skin CA Remission status post.  Other CA cervical Remission status post Surgery.    Other:            Physical Exam    Airway        Mallampati: I   TM distance: > 3 FB   Neck ROM: full   Mouth opening: > 3 cm    Respiratory Devices and Support         Dental       (+) missing      Cardiovascular   cardiovascular exam normal       Rhythm and rate: regular and normal     Pulmonary   pulmonary exam normal        breath sounds clear to auscultation           OUTSIDE LABS:  CBC:   Lab Results   Component Value Date    WBC 5.4 08/21/2019    HGB 13.5 08/21/2019    HCT 39.0 08/21/2019     08/21/2019     BMP:   Lab Results   Component Value Date     07/29/2020     12/12/2019    POTASSIUM 4.4 07/29/2020    POTASSIUM 4.5 12/12/2019    CHLORIDE 107 07/29/2020    CHLORIDE 106 12/12/2019    CO2 26 07/29/2020    CO2 25 12/12/2019    BUN 16 07/29/2020    BUN 18 12/12/2019    CR 0.85 07/29/2020    CR 0.94 12/12/2019     (H) 07/29/2020     (H) 12/12/2019     COAGS: No results found for: PTT, INR, FIBR  POC: No results found for: BGM, HCG, HCGS  HEPATIC:   Lab  Results   Component Value Date    ALBUMIN 4.3 07/29/2020    PROTTOTAL 7.6 07/29/2020    ALT 36 07/29/2020    AST 24 07/29/2020    ALKPHOS 49 07/29/2020    BILITOTAL 1.1 07/29/2020     OTHER:   Lab Results   Component Value Date    KEIRA 9.2 07/29/2020    TSH 0.66 07/29/2020       Anesthesia Plan    ASA Status:  3   NPO Status:  NPO Appropriate    Anesthesia Type: MAC.   Induction: Intravenous, Propofol.   Maintenance: Balanced.        Consents    Anesthesia Plan(s) and associated risks, benefits, and realistic alternatives discussed. Questions answered and patient/representative(s) expressed understanding.     - Discussed with:  Patient         Postoperative Care            Comments:                JUAN PABLO Carney CRNA

## 2021-03-04 ENCOUNTER — ANESTHESIA (OUTPATIENT)
Dept: SURGERY | Facility: HOSPITAL | Age: 71
End: 2021-03-04
Payer: COMMERCIAL

## 2021-03-04 ENCOUNTER — HOSPITAL ENCOUNTER (OUTPATIENT)
Facility: HOSPITAL | Age: 71
Discharge: HOME | End: 2021-03-04
Attending: SURGERY | Admitting: SURGERY
Payer: COMMERCIAL

## 2021-03-04 VITALS
TEMPERATURE: 98.4 F | SYSTOLIC BLOOD PRESSURE: 120 MMHG | DIASTOLIC BLOOD PRESSURE: 73 MMHG | HEART RATE: 76 BPM | BODY MASS INDEX: 23.82 KG/M2 | OXYGEN SATURATION: 96 % | WEIGHT: 148.2 LBS | HEIGHT: 66 IN

## 2021-03-04 DIAGNOSIS — Z12.11 SCREENING FOR MALIGNANT NEOPLASM OF COLON: ICD-10-CM

## 2021-03-04 PROCEDURE — 250N000011 HC RX IP 250 OP 636: Performed by: NURSE ANESTHETIST, CERTIFIED REGISTERED

## 2021-03-04 PROCEDURE — 45378 DIAGNOSTIC COLONOSCOPY: CPT | Performed by: NURSE ANESTHETIST, CERTIFIED REGISTERED

## 2021-03-04 PROCEDURE — G0105 COLORECTAL SCRN; HI RISK IND: HCPCS | Performed by: SURGERY

## 2021-03-04 PROCEDURE — 370N000017 HC ANESTHESIA TECHNICAL FEE, PER MIN: Performed by: SURGERY

## 2021-03-04 PROCEDURE — 250N000009 HC RX 250: Performed by: NURSE ANESTHETIST, CERTIFIED REGISTERED

## 2021-03-04 PROCEDURE — 99100 ANES PT EXTEME AGE<1 YR&>70: CPT | Performed by: NURSE ANESTHETIST, CERTIFIED REGISTERED

## 2021-03-04 PROCEDURE — 258N000003 HC RX IP 258 OP 636: Performed by: NURSE ANESTHETIST, CERTIFIED REGISTERED

## 2021-03-04 PROCEDURE — 710N000012 HC RECOVERY PHASE 2, PER MINUTE: Performed by: SURGERY

## 2021-03-04 PROCEDURE — 999N000141 HC STATISTIC PRE-PROCEDURE NURSING ASSESSMENT: Performed by: SURGERY

## 2021-03-04 PROCEDURE — 360N000075 HC SURGERY LEVEL 2, PER MIN: Performed by: SURGERY

## 2021-03-04 RX ORDER — ALBUTEROL SULFATE 0.83 MG/ML
2.5 SOLUTION RESPIRATORY (INHALATION) EVERY 4 HOURS PRN
Status: DISCONTINUED | OUTPATIENT
Start: 2021-03-04 | End: 2021-03-04 | Stop reason: HOSPADM

## 2021-03-04 RX ORDER — MEPERIDINE HYDROCHLORIDE 25 MG/ML
12.5 INJECTION INTRAMUSCULAR; INTRAVENOUS; SUBCUTANEOUS
Status: DISCONTINUED | OUTPATIENT
Start: 2021-03-04 | End: 2021-03-04 | Stop reason: HOSPADM

## 2021-03-04 RX ORDER — ONDANSETRON 4 MG/1
4 TABLET, ORALLY DISINTEGRATING ORAL EVERY 30 MIN PRN
Status: DISCONTINUED | OUTPATIENT
Start: 2021-03-04 | End: 2021-03-04 | Stop reason: HOSPADM

## 2021-03-04 RX ORDER — LIDOCAINE HYDROCHLORIDE 20 MG/ML
INJECTION, SOLUTION INFILTRATION; PERINEURAL PRN
Status: DISCONTINUED | OUTPATIENT
Start: 2021-03-04 | End: 2021-03-04

## 2021-03-04 RX ORDER — SODIUM CHLORIDE, SODIUM LACTATE, POTASSIUM CHLORIDE, CALCIUM CHLORIDE 600; 310; 30; 20 MG/100ML; MG/100ML; MG/100ML; MG/100ML
INJECTION, SOLUTION INTRAVENOUS CONTINUOUS
Status: DISCONTINUED | OUTPATIENT
Start: 2021-03-04 | End: 2021-03-04 | Stop reason: HOSPADM

## 2021-03-04 RX ORDER — NALOXONE HYDROCHLORIDE 0.4 MG/ML
0.2 INJECTION, SOLUTION INTRAMUSCULAR; INTRAVENOUS; SUBCUTANEOUS
Status: DISCONTINUED | OUTPATIENT
Start: 2021-03-04 | End: 2021-03-04 | Stop reason: HOSPADM

## 2021-03-04 RX ORDER — LABETALOL 20 MG/4 ML (5 MG/ML) INTRAVENOUS SYRINGE
10
Status: DISCONTINUED | OUTPATIENT
Start: 2021-03-04 | End: 2021-03-04 | Stop reason: HOSPADM

## 2021-03-04 RX ORDER — NALOXONE HYDROCHLORIDE 0.4 MG/ML
0.4 INJECTION, SOLUTION INTRAMUSCULAR; INTRAVENOUS; SUBCUTANEOUS
Status: DISCONTINUED | OUTPATIENT
Start: 2021-03-04 | End: 2021-03-04 | Stop reason: HOSPADM

## 2021-03-04 RX ORDER — HYDROMORPHONE HYDROCHLORIDE 1 MG/ML
.3-.5 INJECTION, SOLUTION INTRAMUSCULAR; INTRAVENOUS; SUBCUTANEOUS EVERY 10 MIN PRN
Status: DISCONTINUED | OUTPATIENT
Start: 2021-03-04 | End: 2021-03-04 | Stop reason: HOSPADM

## 2021-03-04 RX ORDER — FENTANYL CITRATE 50 UG/ML
25-50 INJECTION, SOLUTION INTRAMUSCULAR; INTRAVENOUS EVERY 5 MIN PRN
Status: DISCONTINUED | OUTPATIENT
Start: 2021-03-04 | End: 2021-03-04 | Stop reason: HOSPADM

## 2021-03-04 RX ORDER — LIDOCAINE 40 MG/G
CREAM TOPICAL
Status: DISCONTINUED | OUTPATIENT
Start: 2021-03-04 | End: 2021-03-04 | Stop reason: HOSPADM

## 2021-03-04 RX ORDER — PROPOFOL 10 MG/ML
INJECTION, EMULSION INTRAVENOUS PRN
Status: DISCONTINUED | OUTPATIENT
Start: 2021-03-04 | End: 2021-03-04

## 2021-03-04 RX ORDER — ONDANSETRON 2 MG/ML
4 INJECTION INTRAMUSCULAR; INTRAVENOUS EVERY 30 MIN PRN
Status: DISCONTINUED | OUTPATIENT
Start: 2021-03-04 | End: 2021-03-04 | Stop reason: HOSPADM

## 2021-03-04 RX ADMIN — SODIUM CHLORIDE, POTASSIUM CHLORIDE, SODIUM LACTATE AND CALCIUM CHLORIDE: 600; 310; 30; 20 INJECTION, SOLUTION INTRAVENOUS at 08:53

## 2021-03-04 RX ADMIN — PROPOFOL 40 MG: 10 INJECTION, EMULSION INTRAVENOUS at 09:34

## 2021-03-04 RX ADMIN — SODIUM CHLORIDE, POTASSIUM CHLORIDE, SODIUM LACTATE AND CALCIUM CHLORIDE: 600; 310; 30; 20 INJECTION, SOLUTION INTRAVENOUS at 08:52

## 2021-03-04 RX ADMIN — PROPOFOL 20 MG: 10 INJECTION, EMULSION INTRAVENOUS at 09:41

## 2021-03-04 RX ADMIN — PROPOFOL 20 MG: 10 INJECTION, EMULSION INTRAVENOUS at 09:38

## 2021-03-04 RX ADMIN — PROPOFOL 40 MG: 10 INJECTION, EMULSION INTRAVENOUS at 09:23

## 2021-03-04 RX ADMIN — LIDOCAINE HYDROCHLORIDE 40 MG: 20 INJECTION, SOLUTION INFILTRATION; PERINEURAL at 09:23

## 2021-03-04 RX ADMIN — PROPOFOL 20 MG: 10 INJECTION, EMULSION INTRAVENOUS at 09:26

## 2021-03-04 RX ADMIN — PROPOFOL 40 MG: 10 INJECTION, EMULSION INTRAVENOUS at 09:31

## 2021-03-04 RX ADMIN — PROPOFOL 40 MG: 10 INJECTION, EMULSION INTRAVENOUS at 09:29

## 2021-03-04 ASSESSMENT — MIFFLIN-ST. JEOR: SCORE: 1203.98

## 2021-03-04 NOTE — H&P
Surgery Consult Clinic Note      RE: Farida Baldwin  : 1950        Chief Complaint:  Colon cancer screening  Personal history of colon resection  Personal history of colon polyps    History of Present Illness:  I am seeing Farida Baldwin at the request of Irma Buchanan NP for evaluation regarding meet and greet screening colonoscopy.  She had a right hemicolectomy in  due to wide base polyp with atypia; she has had several colonoscopies with tubular adenomas removed, the last in  with one tubular adenoma removed.  She denies family history of colon or rectal cancer, blood in stool, changes in bowel habits, weight loss, abdominal pain.  Previous abdominal surgeries include appendectomy and laparoscopic right colon resection.   She has no questions regarding  bowel prep.  Reports passing clear liquid stools today.     She specifically denies fevers, chills, nausea, vomiting, chest pain, shortness of breath, palpitations, sore throat, cough, or generalized feeling ill.   She had a negative COVID 19 PCR test on 2021.    Medical history:  Past Medical History:   Diagnosis Date     Gastroesophageal reflux disease with esophagitis 2016     Hyperlipidemia LDL goal < 100 2012     Osteopenia 2014     Taking a statin medication 5/10/2017     Tubulovillous adenoma polyp of colon 2012       Surgical history:  Past Surgical History:   Procedure Laterality Date     APPENDECTOMY       BIOPSY      polypectomy-benign     COLONOSCOPY  2012     COLONOSCOPY  2013    Procedure: COLONOSCOPY;  COLONOSCOPY WITH BIOPSIES;  Surgeon: Tana Benjamin MD;  [facility]: [facility]     COLONOSCOPY  2013    Procedure: COLONOSCOPY;  WHOLE COLON COLONOSCOPY WITH POLYPECTOMY;  Surgeon: Tana Benjamin MD;  [facility]     COLONOSCOPY N/A 2015    Procedure: COLONOSCOPY;  Surgeon: Tana Benjamin MD;  Location[facility]     COLONOSCOPY N/A 2017    Procedure: COLONOSCOPY;  COLONOSCOPY  WITH POLYPECTOMY;  Surgeon: Arthur Alaniz DO;  Location: [facility]     ESOPHAGOSCOPY, GASTROSCOPY, DUODENOSCOPY (EGD), COMBINED N/A 2/12/2016    Procedure: COMBINED ESOPHAGOSCOPY, GASTROSCOPY, DUODENOSCOPY (EGD);  Surgeon: Arthur Alaniz DO;  Location: [facility]     GYN SURGERY      complete hyst     Laproscopic-assisted resection of right colon neoplasm  2012     TONSILLECTOMY  1955       Family history:  Family History   Problem Relation Age of Onset     Other - See Comments Father         (82 Diagnosis); cause of death     C.A.D. Father      Cancer Mother 38        pancreatic       Medications:  Prior to Admission medications    Medication Sig Start Date End Date Taking? Authorizing Provider   Ascorbic Acid (VITAMIN C) 500 MG CAPS Take  by mouth. 1 cap daily   Yes Reported, Patient   aspirin 81 MG EC tablet Take 1 tablet (81 mg) by mouth daily  Patient taking differently: Take 81 mg by mouth three times a week  4/29/15  Yes Irma Buchanan CNP   MAGNESIUM GLUCONATE PO Take 400 mg by mouth 2 times daily   Yes Reported, Patient   Multiple Vitamins-Iron (MULTIVITAMIN/IRON PO) Take by mouth daily 1 daily   Yes Reported, Patient   pravastatin (PRAVACHOL) 20 MG tablet Take 1 tablet (20 mg) by mouth daily 7/29/20  Yes Irma Buchanan CNP   VITAMIN E 1 tab daily   Yes Reported, Patient   estradiol (ESTRACE) 0.1 MG/GM vaginal cream Place 1 g vaginally daily Massage a pea size amount into the vaginal tissues daily.  Patient not taking: Reported on 8/21/2019 12/12/18   Hamida Davila, JAMES   meclizine (ANTIVERT) 25 MG tablet Take 1 tablet (25 mg) by mouth 3 times daily as needed for dizziness  Patient not taking: Reported on 7/29/2020 8/21/19   Irma Buchanan CNP       Allergies:  The patient is allergic to fosamax [alendronic acid].  .  Social history:  Social History     Tobacco Use     Smoking status: Former Smoker     Packs/day: 1.00     Years: 25.00     Pack years: 25.00     Types: Cigarettes     Smokeless tobacco: Never Used  "    Tobacco comment: year quit 1989   Substance Use Topics     Alcohol use: Yes     Comment: 1 beer daily. last drink, last night.     Marital status: .    Review of Systems:    Constitutional: Negative for fever, chills.   HENT: Negative for ear pain, congestion, sore throat, and ear discharge.    Eyes: Negative for blurred vision, double vision.   Respiratory: Negative for cough, hemoptysis, shortness of breath, wheezing and stridor.    Cardiovascular: Negative for chest pain, palpitations and orthopnea.   Gastrointestinal: Negative for heartburn, nausea, vomiting, abdominal pain and blood in stool.   Genitourinary: Negative for urgency, frequency   Musculoskeletal: Negative for myalgias, back pain and joint pain.   Neurological: Negative for tingling, speech change and headaches.   Endo/Heme/Allergies: Does not bruise/bleed easily.   Psychiatric/Behavioral: Negative for depression, suicidal ideas and hallucinations. The patient is not nervous/anxious.    Physical Examination:  /70   Pulse 98   Temp 98.4  F (36.9  C) (Tympanic)   Ht 1.676 m (5' 6\")   Wt 67.2 kg (148 lb 3.2 oz)   SpO2 94%   BMI 23.92 kg/m    General: Alert and orientedx4, no acute distress, well-developed/well-nourished, ambulating without assistance  HEENT: normocephalic atraumatic, extraocular movements intact, sclerae anicteric, Trachea mideline  Chest:   Clear to auscultation bilaterally.  Cardiac: S1S2 , regular rate and rhythm without additional sounds  Abdomen: Soft, non-tender, non-distended  Extremities: Cursory exam unremarkable.  No peripheral edema noted.  Skin: Warm, dry, < 2 sec cap refill  Neuro: CN 2-12 grossly intact, no focal deficit, GCS 15  Psych: Pleasant, calm, asks appropriate questions      Assessment/Plan:  #1 Colonoscopy  #2 Personal history tubular adenomas  #3 Right colon resection due to tubulovillous adenoma    Farida Baldwin and I had a discussion about colonoscopies.  The indications, risks, " benefits, althernatives and technical aspects of whole colon colonoscopy were outlined with risks including, but not limited to, perforation, bleeding and inability to visualize entire colon.  Management of each was reviewed including the risk for life saving surgery and possible admittance to the hospital.  Her questions were asked and answered.  We will proceed with colonoscopy with Dr. Howard as scheduled.  [facility] [place] [address]    Referring Provider:  No referring provider defined for this encounter.     Primary Care Provider:  Irma Buchanan

## 2021-03-04 NOTE — SIGNIFICANT EVENT
Patient and responsible adult given discharge instructions with no questions regarding instructions. Oseas score 18. Pain level 0/10.  Discharged from unit via walking . Patient discharged to home .

## 2021-03-04 NOTE — ANESTHESIA POSTPROCEDURE EVALUATION
Patient: Farida Baldwin    Procedure(s):  COLONOSCOPY    Diagnosis:Screening for malignant neoplasm of colon [Z12.11]  Diagnosis Additional Information: No value filed.    Anesthesia Type:  MAC    Note:  Disposition: Outpatient   Postop Pain Control: Uneventful            Sign Out: Well controlled pain   PONV: No   Neuro/Psych: Uneventful            Sign Out: Acceptable/Baseline neuro status   Airway/Respiratory: Uneventful            Sign Out: Acceptable/Baseline resp. status   CV/Hemodynamics: Uneventful            Sign Out: Acceptable CV status   Other NRE: NONE   DID A NON-ROUTINE EVENT OCCUR? No         Last vitals:  Vitals:    03/04/21 0836 03/04/21 0840 03/04/21 0950   BP: 151/70  107/60   Pulse: 98  74   Temp: 98.4  F (36.9  C)     SpO2: 94% 94% 97%       Last vitals prior to Anesthesia Care Transfer:  CRNA VITALS  3/4/2021 0914 - 3/4/2021 0957      3/4/2021             Resp Rate (set):  8          Electronically Signed By: JUAN PABLO La CRNA  March 4, 2021  9:57 AM

## 2021-03-04 NOTE — DISCHARGE INSTRUCTIONS

## 2021-03-04 NOTE — ANESTHESIA CARE TRANSFER NOTE
Patient: Farida Baldwin    Procedure(s):  COLONOSCOPY    Diagnosis: Screening for malignant neoplasm of colon [Z12.11]  Diagnosis Additional Information: No value filed.    Anesthesia Type:   MAC     Note:      Level of Consciousness: drowsy  Oxygen Supplementation: nasal cannula  Level of Supplemental Oxygen (L/min / FiO2): 2  Independent Airway: airway patency satisfactory and stable  Dentition: dentition unchanged  Vital Signs Stable: post-procedure vital signs reviewed and stable  Report to RN Given: handoff report given  Patient transferred to: Phase II    Handoff Report: Identifed the Patient, Identified the Reponsible Provider, Reviewed the pertinent medical history, Discussed the surgical course, Reviewed Intra-OP anesthesia mangement and issues during anesthesia, Set expectations for post-procedure period and Allowed opportunity for questions and acknowledgement of understanding      Vitals: (Last set prior to Anesthesia Care Transfer)  CRNA VITALS  3/4/2021 0914 - 3/4/2021 0945      3/4/2021             Resp Rate (set):  8        Electronically Signed By: JUAN PABLO Carney CRNA  March 4, 2021  9:45 AM

## 2021-03-04 NOTE — OP NOTE
REPORT OF OPERATION  DATE OF PROCEDURE: 3/4/2021    PATIENT: Farida Baldwin    SURGERY PREFORMED: Colonoscopy    PREOPERATIVE DIAGNOSIS: Screening colonoscopy, History of Colon Polyps and s/p right hemicolectomy    POSTOPERATIVE DIAGNOSIS:    Same   Normal colonoscopy   Diverticulosis was identified.   Hemorrhoids  were  identified.    SURGEON: Joe Howard MD    ASSISTANTS: None    ANESTHESIA: Monitored Anesthesia Care    COMPLICATIONS: None apparent    TRANSFUSIONS: None    TISSUE TO PATHOLOGY: None    FINDINGS: Normal colonoscopy.  Diverticulosis was identified.  Hemorrhoids  were  identified.    INDICATIONS: This is a 70 year old female in need of a colonoscopy for Screening colonoscopy, History of Colon Polyps and s/p right hemicolectomy.  The patient will be taken to the endoscopy suite for that procedure.    DESCRIPTIONS OF PROCEDURE IN DETAIL: After consent was obtained the patient was taken to the endoscopy suite and placed in the left lateral decubitus position.  The patient was identified and the correct patient was confirmed.  Monitored Anesthesia Care was given.  A time out was preformed verifying the correct patient and the correct procedure.  The entire operative team was in agreement.  All necessary equipment and supplies were in the room.    Rectal exam was preformed and no lesions of the anal canal were noted.  The colonoscope was inserted into the anus and passed without difficulty to the anastomosis of the small bowel to the transverse colon.  Upon withdrawal all walls of the colon were visualized.  There were no polyps, masses or evidence of colitis seen.  Diverticulosis was seen.  Upon reaching the rectum the scope was retroflexed and internal hemorrhoids  were  seen.  The scope was straightened back out and removed from the patient.  The patient was then taken to the recovery room in stable condition tolerating the procedure well.      Prep: fair    Withdrawal time was 6 minutes.    It  is recommended that the patient have another colonoscopy in 5 years.

## 2021-03-25 NOTE — PROGRESS NOTES
"    Assessment & Plan     1. Pneumonia of right upper lobe due to infectious organism  Doxycycline prescribed.  Symptomatic cares reviewed.  After Radiology read seen, will schedule follow-up appointment for about one month to ensure resolution of infiltrate.  Follow-up otherwise as directed.  - doxycycline hyclate (VIBRA-TABS) 100 MG tablet; Take 1 tablet (100 mg) by mouth 2 times daily for 10 days  Dispense: 20 tablet; Refill: 0    2. Cough  - CBC with platelets and differential  - XR CHEST 2 VW (Clinic Performed); Future     BMI:   Estimated body mass index is 25.13 kg/m  as calculated from the following:    Height as of this encounter: 1.651 m (5' 5\").    Weight as of this encounter: 68.5 kg (151 lb).     Return in about 4 weeks (around 4/23/2021) for repeat CXR.    Maine Marcum MD  [facility] [place] FERDINAND Iqbal is a 70 year old who presents for the following health issues     HPI     Acute Illness  Acute illness concerns: cough sob  Onset/Duration: 2 month   Symptoms:  Fever: YES- Monday   Chills/Sweats: no  Headache (location?): no  Sinus Pressure: no  Conjunctivitis:  no  Ear Pain: no  Rhinorrhea: no  Congestion: no  Sore Throat: no  Cough: YES-non-productive, with shortness of breath, barking  Wheeze: no  Decreased Appetite: YES  Nausea: no  Vomiting: no  Diarrhea: no  Dysuria/Freq.: no  Dysuria or Hematuria: no  Fatigue/Achiness: no  Sick/Strep Exposure: no  Therapies tried and outcome: None      Review of Systems   Constitutional, HEENT, cardiovascular, pulmonary, gi and gu systems are negative, except as otherwise noted.      Objective    /62 (BP Location: Right arm, Patient Position: Chair, Cuff Size: Adult Regular)   Pulse 73   Temp 97.2  F (36.2  C) (Tympanic)   Resp 18   Ht 1.651 m (5' 5\")   Wt 68.5 kg (151 lb)   SpO2 97%   BMI 25.13 kg/m    Body mass index is 25.13 kg/m .  Physical Exam   GENERAL: alert and no distress  EYES: Eyes grossly normal to " inspection, PERRL and conjunctivae and sclerae normal  HENT: ear canals and TM's normal, nose and mouth without ulcers or lesions  NECK: no adenopathy  RESP: no rhonchi and E to A changes heard right lung field  CV: regular rates and rhythm, normal S1 S2, no S3 or S4 and no murmur, click or rub  PSYCH: mentation appears normal, affect normal/bright    CXR - Reviewed and interpreted by me Airspace opacity seen in the right upper lobe    Results for orders placed or performed in visit on 03/26/21   CBC with platelets and differential     Status: None   Result Value Ref Range    WBC 5.3 4.0 - 11.0 10e9/L    RBC Count 4.14 3.8 - 5.2 10e12/L    Hemoglobin 13.5 11.7 - 15.7 g/dL    Hematocrit 38.3 35.0 - 47.0 %    MCV 93 78 - 100 fl    MCH 32.6 26.5 - 33.0 pg    MCHC 35.2 31.5 - 36.5 g/dL    RDW 12.5 10.0 - 15.0 %    Platelet Count 226 150 - 450 10e9/L    % Neutrophils 41.4 %    % Lymphocytes 34.7 %    % Monocytes 20.6 %    % Eosinophils 2.5 %    % Basophils 0.8 %    Absolute Neutrophil 2.2 1.6 - 8.3 10e9/L    Absolute Lymphocytes 1.8 0.8 - 5.3 10e9/L    Absolute Monocytes 1.1 0.0 - 1.3 10e9/L    Absolute Eosinophils 0.1 0.0 - 0.7 10e9/L    Absolute Basophils 0.0 0.0 - 0.2 10e9/L    Diff Method Automated Method    Results for orders placed or performed in visit on 03/26/21   XR CHEST 2 VW (Clinic Performed)     Status: None    Narrative    PROCEDURE: XR CHEST 2 VW 3/26/2021 1:46 PM    HISTORY: Cough    COMPARISONS: None.    TECHNIQUE: 2 views.    FINDINGS: There is focal patchy airspace disease in the right upper  lobe most consistent with pneumonia. Lungs are otherwise relatively  clear. No pleural effusion is seen.    Heart is not enlarged.         Impression    IMPRESSION: Right upper lobe pneumonia. Recommend follow-up to  resolution to exclude central mass with postobstructive change.    CLINT ALAS MD

## 2021-03-26 ENCOUNTER — OFFICE VISIT (OUTPATIENT)
Dept: FAMILY MEDICINE | Facility: OTHER | Age: 71
End: 2021-03-26
Attending: NURSE PRACTITIONER
Payer: COMMERCIAL

## 2021-03-26 ENCOUNTER — ANCILLARY PROCEDURE (OUTPATIENT)
Dept: GENERAL RADIOLOGY | Facility: OTHER | Age: 71
End: 2021-03-26
Attending: FAMILY MEDICINE
Payer: COMMERCIAL

## 2021-03-26 VITALS
OXYGEN SATURATION: 97 % | WEIGHT: 151 LBS | HEART RATE: 73 BPM | DIASTOLIC BLOOD PRESSURE: 62 MMHG | SYSTOLIC BLOOD PRESSURE: 128 MMHG | BODY MASS INDEX: 25.16 KG/M2 | HEIGHT: 65 IN | RESPIRATION RATE: 18 BRPM | TEMPERATURE: 97.2 F

## 2021-03-26 DIAGNOSIS — J18.9 PNEUMONIA OF RIGHT UPPER LOBE DUE TO INFECTIOUS ORGANISM: Primary | ICD-10-CM

## 2021-03-26 DIAGNOSIS — R05.9 COUGH: ICD-10-CM

## 2021-03-26 LAB
BASOPHILS # BLD AUTO: 0 10E9/L (ref 0–0.2)
BASOPHILS NFR BLD AUTO: 0.8 %
DIFFERENTIAL METHOD BLD: NORMAL
EOSINOPHIL # BLD AUTO: 0.1 10E9/L (ref 0–0.7)
EOSINOPHIL NFR BLD AUTO: 2.5 %
ERYTHROCYTE [DISTWIDTH] IN BLOOD BY AUTOMATED COUNT: 12.5 % (ref 10–15)
HCT VFR BLD AUTO: 38.3 % (ref 35–47)
HGB BLD-MCNC: 13.5 G/DL (ref 11.7–15.7)
LYMPHOCYTES # BLD AUTO: 1.8 10E9/L (ref 0.8–5.3)
LYMPHOCYTES NFR BLD AUTO: 34.7 %
MCH RBC QN AUTO: 32.6 PG (ref 26.5–33)
MCHC RBC AUTO-ENTMCNC: 35.2 G/DL (ref 31.5–36.5)
MCV RBC AUTO: 93 FL (ref 78–100)
MONOCYTES # BLD AUTO: 1.1 10E9/L (ref 0–1.3)
MONOCYTES NFR BLD AUTO: 20.6 %
NEUTROPHILS # BLD AUTO: 2.2 10E9/L (ref 1.6–8.3)
NEUTROPHILS NFR BLD AUTO: 41.4 %
PLATELET # BLD AUTO: 226 10E9/L (ref 150–450)
RBC # BLD AUTO: 4.14 10E12/L (ref 3.8–5.2)
WBC # BLD AUTO: 5.3 10E9/L (ref 4–11)

## 2021-03-26 PROCEDURE — 99214 OFFICE O/P EST MOD 30 MIN: CPT | Performed by: FAMILY MEDICINE

## 2021-03-26 PROCEDURE — 36415 COLL VENOUS BLD VENIPUNCTURE: CPT | Mod: ZL | Performed by: FAMILY MEDICINE

## 2021-03-26 PROCEDURE — 85025 COMPLETE CBC W/AUTO DIFF WBC: CPT | Mod: ZL | Performed by: FAMILY MEDICINE

## 2021-03-26 PROCEDURE — G0463 HOSPITAL OUTPT CLINIC VISIT: HCPCS | Mod: 25

## 2021-03-26 PROCEDURE — 71046 X-RAY EXAM CHEST 2 VIEWS: CPT | Mod: TC,FY

## 2021-03-26 PROCEDURE — G0463 HOSPITAL OUTPT CLINIC VISIT: HCPCS

## 2021-03-26 RX ORDER — DOXYCYCLINE HYCLATE 100 MG
100 TABLET ORAL 2 TIMES DAILY
Qty: 20 TABLET | Refills: 0 | Status: SHIPPED | OUTPATIENT
Start: 2021-03-26 | End: 2021-04-05

## 2021-03-26 ASSESSMENT — MIFFLIN-ST. JEOR: SCORE: 1200.81

## 2021-03-26 ASSESSMENT — PAIN SCALES - GENERAL: PAINLEVEL: NO PAIN (0)

## 2021-03-26 NOTE — NURSING NOTE
"Chief Complaint   Patient presents with     Cough     Breathing Problem       Initial /62 (BP Location: Right arm, Patient Position: Chair, Cuff Size: Adult Regular)   Pulse 73   Temp 97.2  F (36.2  C) (Tympanic)   Resp 18   Ht 1.651 m (5' 5\")   Wt 68.5 kg (151 lb)   SpO2 97%   BMI 25.13 kg/m   Estimated body mass index is 25.13 kg/m  as calculated from the following:    Height as of this encounter: 1.651 m (5' 5\").    Weight as of this encounter: 68.5 kg (151 lb).  Medication Reconciliation: complete  Georgina Burnette LPN    "

## 2021-04-01 NOTE — PROGRESS NOTES
"    Assessment & Plan     1. Pneumonia of right upper lobe due to infectious organism  With mild persistent symptoms, will retreat with antibiotics and steroids.  Due to continued right sided opacity, will order CT scan.  Follow-up with results when available.  - XR CHEST 2 VW (Clinic Performed); Future  - amoxicillin-clavulanate (AUGMENTIN) 875-125 MG tablet; Take 1 tablet by mouth 2 times daily for 10 days  Dispense: 20 tablet; Refill: 0  - predniSONE (DELTASONE) 20 MG tablet; Take 1 tablet (20 mg) by mouth daily for 5 days  Dispense: 5 tablet; Refill: 0  - CT Chest w/o Contrast      Return if symptoms worsen or fail to improve.    Maine Marcum MD  [facility] - [place] Iqbal is a 70 year old who presents for the following health issues     HPI     Concern - follow up pneumonia  Onset: 3/26/21  Description: still coughing, SOB with exertion   Intensity: moderate  Progression of Symptoms:  same  Accompanying Signs & Symptoms: see above  Previous history of similar problem: 3/26 visit  Precipitating factors:        Worsened by:   Alleviating factors:        Improved by:   Therapies tried and outcome: doxycycline BID for 10 days- was effective, overall feels better, but does have some lingering symptoms.    Previous CXR report recommended repeat CXR to rule out central mass with postobstructive changes.      Review of Systems   Constitutional, HEENT, cardiovascular, pulmonary, gi and gu systems are negative, except as otherwise noted.      Objective    /68   Pulse 71   Temp 97.4  F (36.3  C) (Tympanic)   Resp 18   Ht 1.664 m (5' 5.5\")   Wt 68 kg (150 lb 0.2 oz)   SpO2 97%   BMI 24.58 kg/m    Body mass index is 24.58 kg/m .  Physical Exam   GENERAL: healthy, alert and no distress  PSYCH: mentation appears normal, affect normal/bright    Xr Chest 2 Vw (clinic Performed)    Result Date: 4/29/2021  PROCEDURE:  XR CHEST 2 VW HISTORY:  Pneumonia of right upper lobe due to " infectious organism. COMPARISON:  March 26, 2021 FINDINGS: The cardiac silhouette is normal in size. The pulmonary vasculature is normal.  There is a right upper lobe opacity that has remained stable as compared to March 26, 2021. No pleural effusion or pneumothorax.     IMPRESSION:  Right upper lobe opacity stable from previous examination.  GONZALEZ HAWKINS MD

## 2021-04-29 ENCOUNTER — OFFICE VISIT (OUTPATIENT)
Dept: FAMILY MEDICINE | Facility: OTHER | Age: 71
End: 2021-04-29
Attending: FAMILY MEDICINE
Payer: COMMERCIAL

## 2021-04-29 ENCOUNTER — ANCILLARY PROCEDURE (OUTPATIENT)
Dept: GENERAL RADIOLOGY | Facility: OTHER | Age: 71
End: 2021-04-29
Attending: FAMILY MEDICINE
Payer: COMMERCIAL

## 2021-04-29 VITALS
BODY MASS INDEX: 24.11 KG/M2 | SYSTOLIC BLOOD PRESSURE: 124 MMHG | TEMPERATURE: 97.4 F | OXYGEN SATURATION: 97 % | HEART RATE: 71 BPM | RESPIRATION RATE: 18 BRPM | WEIGHT: 150.01 LBS | HEIGHT: 66 IN | DIASTOLIC BLOOD PRESSURE: 68 MMHG

## 2021-04-29 DIAGNOSIS — J18.9 PNEUMONIA OF RIGHT UPPER LOBE DUE TO INFECTIOUS ORGANISM: ICD-10-CM

## 2021-04-29 DIAGNOSIS — J18.9 PNEUMONIA OF RIGHT UPPER LOBE DUE TO INFECTIOUS ORGANISM: Primary | ICD-10-CM

## 2021-04-29 PROCEDURE — G0463 HOSPITAL OUTPT CLINIC VISIT: HCPCS | Mod: 25

## 2021-04-29 PROCEDURE — G0463 HOSPITAL OUTPT CLINIC VISIT: HCPCS

## 2021-04-29 PROCEDURE — 71046 X-RAY EXAM CHEST 2 VIEWS: CPT | Mod: TC,FY

## 2021-04-29 PROCEDURE — 99214 OFFICE O/P EST MOD 30 MIN: CPT | Performed by: FAMILY MEDICINE

## 2021-04-29 RX ORDER — PREDNISONE 20 MG/1
20 TABLET ORAL DAILY
Qty: 5 TABLET | Refills: 0 | Status: SHIPPED | OUTPATIENT
Start: 2021-04-29 | End: 2021-05-04

## 2021-04-29 ASSESSMENT — MIFFLIN-ST. JEOR: SCORE: 1204.25

## 2021-04-29 ASSESSMENT — PAIN SCALES - GENERAL: PAINLEVEL: NO PAIN (0)

## 2021-04-29 NOTE — NURSING NOTE
"Chief Complaint   Patient presents with     Follow Up     Pneumonia       Initial /68   Pulse 71   Temp 97.4  F (36.3  C) (Tympanic)   Resp 18   Ht 1.664 m (5' 5.5\")   Wt 68 kg (150 lb 0.2 oz)   SpO2 97%   BMI 24.58 kg/m   Estimated body mass index is 24.58 kg/m  as calculated from the following:    Height as of this encounter: 1.664 m (5' 5.5\").    Weight as of this encounter: 68 kg (150 lb 0.2 oz).  Medication Reconciliation: complete  Zoey Izquierdo LPN  "

## 2021-05-19 ENCOUNTER — HOSPITAL ENCOUNTER (OUTPATIENT)
Dept: CT IMAGING | Facility: HOSPITAL | Age: 71
Discharge: HOME | End: 2021-05-19
Attending: FAMILY MEDICINE | Admitting: FAMILY MEDICINE
Payer: COMMERCIAL

## 2021-05-19 PROCEDURE — 71250 CT THORAX DX C-: CPT

## 2021-05-28 NOTE — TELEPHONE ENCOUNTER
Pt called reports she was dx with pneumonia in March, pt reports 3 rounds of antibiotic tx and would like to confirm pneumonia is resolved. Only sx at this time is a mild to moderate cough, dry during the day wet at night, not productive. Denies fever reports she is feeling better.   Pt reports not an emergency to be seen.     Next 5 appointments (look out 90 days)    Jun 01, 2021 10:45 AM  (Arrive by 10:30 AM)  SHORT with Irma Buchanan CNP  [facility] ([facility] ) [address]  [facility] [address]  [phone]

## 2021-06-01 NOTE — PROGRESS NOTES
Assessment & Plan     Encounter for screening mammogram for malignant neoplasm of breast  - MA SCREENING DIGITAL BILATERAL ([place]); Future    History of pneumonia  - PULMONARY MEDICINE REFERRAL  - albuterol (PROAIR HFA/PROVENTIL HFA/VENTOLIN HFA) 108 (90 Base) MCG/ACT inhaler; Inhale 2 puffs into the lungs every 6 hours as needed for shortness of breath / dyspnea or wheezing    SOB (shortness of breath)  - PULMONARY MEDICINE REFERRAL  - albuterol (PROAIR HFA/PROVENTIL HFA/VENTOLIN HFA) 108 (90 Base) MCG/ACT inhaler; Inhale 2 puffs into the lungs every 6 hours as needed for shortness of breath / dyspnea or wheezing      Return July - annual physical, fasting.      Irma Buchanan, JAGDISH  [facility] - [place] Iqbal is a 70 year old who presents for the following health issues       Concern - Follow up Pneumonia  Onset: 03/2021  Description: Pneumonia of the right upper lobe  Intensity: moderate  Progression of Symptoms:  same  Accompanying Signs & Symptoms: cough, SOB with exertion, occasionally ache around the right scapula   Previous history of similar problem: XR done, CT of the chest completed  Precipitating factors:        Worsened by: cough and sob worse in the evening  Alleviating factors:        Improved by: none  Therapies tried and outcome: doxycycline and 2 rounds of augmentin with no relief or improvement with symptoms      Symptoms do not seem to have improved  Continue with SOB with activity  No hemoptysis or sputum with cough      1 ppd smoker for 25 years, quit 1989      Sequential imaging attached below        PROCEDURE: XR CHEST 2 VW 3/26/2021 1:46 PM     HISTORY: Cough     COMPARISONS: None.     TECHNIQUE: 2 views.     FINDINGS: There is focal patchy airspace disease in the right upper  lobe most consistent with pneumonia. Lungs are otherwise relatively  clear. No pleural effusion is seen.     Heart is not enlarged.                                                                    IMPRESSION: Right upper lobe pneumonia. Recommend follow-up to  resolution to exclude central mass with postobstructive change.     CLINT ALAS MD        PROCEDURE:  XR CHEST 2 VW     HISTORY:  Pneumonia of right upper lobe due to infectious organism.      COMPARISON:  March 26, 2021     FINDINGS:   The cardiac silhouette is normal in size. The pulmonary vasculature is  normal.  There is a right upper lobe opacity that has remained stable  as compared to March 26, 2021. No pleural effusion or pneumothorax.                                                                      IMPRESSION:  Right upper lobe opacity stable from previous  examination.       GONZALEZ HAWKINS MD          PROCEDURE: CT CHEST W/O CONTRAST 5/19/2021 11:01 AM     HISTORY: Cough, persistent; Pneumonia of right upper lobe due to  infectious organism     COMPARISONS: Chest x-ray April 29, 2021     Meds/Dose Given:     TECHNIQUE: CT scan of the chest without IV contrast sagittal and  coronal reconstructions were obtained     FINDINGS: There is extensive alveolar opacity in the right upper lobe  with air bronchograms. Extensive alveolar opacities are seen in the  right lower lobe with air bronchograms. There were some mild  groundglass opacities in the left lung. There is some interstitial  thickening seen in both lower lobes. There are several small nodules  seen in the right lower lobe the largest measures 10 mm in diameter.  The hilar and mediastinal lymph nodes appear normal. Axillary and  supraclavicular lymph nodes appear normal. The upper portion of the  liver spleen and pancreas appear normal. The adrenal glands are normal  regional skeleton is intact.                                                                        IMPRESSION: Extensive alveolar opacities with air bronchograms are  noted in the right lung consistent with pneumonia. There is some  nodularity in the right lower lobe which may represent additional  areas of  "pneumonia. There is some interstitial thickening noted in the   lung which appears most likely chronic     GONZALEZ HAWKINS MD        Review of Systems   Constitutional, HEENT, cardiovascular, pulmonary, gi and gu systems are negative, except as otherwise noted.          Objective    /58   Pulse 82   Temp 98.6  F (37  C) (Tympanic)   Resp 16   Ht 1.651 m (5' 5\")   Wt 68.3 kg (150 lb 8 oz)   SpO2 96%   BMI 25.04 kg/m    Body mass index is 25.04 kg/m .       Physical Exam   GENERAL: healthy, alert and no distress  NECK: no adenopathy, no asymmetry, masses, or scars and thyroid normal to palpation  RESP: lungs clear to auscultation - no rales, rhonchi or wheezes; Coughing with deep breaths.  CV: regular rate and rhythm, normal S1 S2, no S3 or S4, no murmur, click or rub, no peripheral edema and peripheral pulses strong  ABDOMEN: soft, nontender, no hepatosplenomegaly, no masses and bowel sounds normal  MS: no gross musculoskeletal defects noted, no edema  PSYCH: mentation appears normal, affect normal/bright                  "

## 2021-06-01 NOTE — NURSING NOTE
"Chief Complaint   Patient presents with     Cough       Initial /58   Pulse 82   Temp 98.6  F (37  C) (Tympanic)   Resp 16   Ht 1.651 m (5' 5\")   Wt 68.3 kg (150 lb 8 oz)   SpO2 96%   BMI 25.04 kg/m   Estimated body mass index is 25.04 kg/m  as calculated from the following:    Height as of this encounter: 1.651 m (5' 5\").    Weight as of this encounter: 68.3 kg (150 lb 8 oz).  Medication Reconciliation: complete  Zoey Izquierdo LPN  "

## 2021-06-01 NOTE — PATIENT INSTRUCTIONS
Assessment & Plan       Encounter for screening mammogram for malignant neoplasm of breast  - MA SCREENING DIGITAL BILATERAL ([place]); Future    History of pneumonia  - PULMONARY MEDICINE REFERRAL  - albuterol (PROAIR HFA/PROVENTIL HFA/VENTOLIN HFA) 108 (90 Base) MCG/ACT inhaler; Inhale 2 puffs into the lungs every 6 hours as needed for shortness of breath / dyspnea or wheezing    SOB (shortness of breath)  - PULMONARY MEDICINE REFERRAL  - albuterol (PROAIR HFA/PROVENTIL HFA/VENTOLIN HFA) 108 (90 Base) MCG/ACT inhaler; Inhale 2 puffs into the lungs every 6 hours as needed for shortness of breath / dyspnea or wheezing      Return July - annual physical, fasting.      Irma Buchanan, JAGDISH  [facility] - [place] [facility]

## 2021-06-01 NOTE — PROGRESS NOTES
Referral with documentation faxed to [facility] at [phone]

## 2021-07-20 NOTE — PROGRESS NOTES
"  SUBJECTIVE:   Farida Baldwin is a 71 year old female who presents for Preventive Visit.      Patient has been advised of split billing requirements and indicates understanding: Yes   Are you in the first 12 months of your Medicare coverage?  No    Healthy Habits:     In general, how would you rate your overall health?  Fair    Frequency of exercise:  None    Do you usually eat at least 4 servings of fruit and vegetables a day, include whole grains    & fiber and avoid regularly eating high fat or \"junk\" foods?  No    Taking medications regularly:  Yes    Ability to successfully perform activities of daily living:  No assistance needed    Home Safety:  No safety concerns identified    Hearing Impairment:  No hearing concerns    In the past 6 months, have you been bothered by leaking of urine? Yes    In general, how would you rate your overall mental or emotional health?  Good      PHQ-2 Total Score: 1    Additional concerns today:  No    Do you feel safe in your environment? Yes    Have you ever done Advance Care Planning? (For example, a Health Directive, POLST, or a discussion with a medical provider or your loved ones about your wishes): Yes, advance care planning is on file.       Fall risk  Fallen 2 or more times in the past year?: No  Any fall with injury in the past year?: No        Reviewed and updated as needed this visit by clinical staff  Tobacco  Allergies  Meds              Reviewed and updated as needed this visit by Provider                Social History     Tobacco Use     Smoking status: Former Smoker     Packs/day: 1.00     Years: 25.00     Pack years: 25.00     Types: Cigarettes     Smokeless tobacco: Never Used     Tobacco comment: year quit 1989   Substance Use Topics     Alcohol use: Yes     Comment: 1 beer daily. last drink, last night.     If you drink alcohol do you typically have >3 drinks per day or >7 drinks per week? Yes        Adenocarcinoma of lung - Undergoing Chemoterapy [facility] -Dr Steen, Oncologist.        Hyperlipidemia Follow-Up    Are you regularly taking any medication or supplement to lower your cholesterol?   Yes- See epic    Are you having muscle aches or other side effects that you think could be caused by your cholesterol lowering medication?  No      PHQ 11/13/2018 7/29/2020 6/1/2021   PHQ-9 Total Score 0 2 0   Q9: Thoughts of better off dead/self-harm past 2 weeks Not at all Not at all Not at all       JENNIFER-7 SCORE 11/13/2018 7/29/2020 6/1/2021   Total Score 0 2 0         Current providers sharing in care for this patient include:   Patient Care Team:  Irma Buchanan CNP as PCP - General  Maine Marcum MD as Assigned PCP       The following health maintenance items are reviewed in Epic and correct as of today:  Health Maintenance Due   Topic Date Due     DTAP/TDAP/TD IMMUNIZATION (1 - Tdap) Never done     ZOSTER IMMUNIZATION (1 of 2) Never done     Pneumococcal Vaccine: 65+ Years (1 of 1 - PPSV23) Never done     INFLUENZA VACCINE (1) 09/01/2021       Lab work is in process  Labs reviewed in EPIC  BP Readings from Last 3 Encounters:   08/30/21 110/54   06/01/21 116/58   04/29/21 124/68    Wt Readings from Last 3 Encounters:   08/30/21 66.7 kg (147 lb)   06/01/21 68.3 kg (150 lb 8 oz)   04/29/21 68 kg (150 lb 0.2 oz)                  Patient Active Problem List   Diagnosis     Hyperlipidemia LDL goal <100     Advanced care planning/counseling discussion     Osteopenia     Gastroesophageal reflux disease with esophagitis     History of colonic polyps - tubular adenoma - colonoscopy every 2 years     Taking a statin medication     Screening for malignant neoplasm of colon     Adenocarcinoma of lung (H)     Past Surgical History:   Procedure Laterality Date     APPENDECTOMY  1956     BIOPSY      polypectomy-benign     COLONOSCOPY  5/17/2012     COLONOSCOPY  5/30/2013    Procedure: COLONOSCOPY;  COLONOSCOPY WITH BIOPSIES;  Surgeon: Tana Benjamin MD;  Location: [facility]      COLONOSCOPY  11/12/2013    Procedure: COLONOSCOPY;  WHOLE COLON COLONOSCOPY WITH POLYPECTOMY;  Surgeon: Tana Benjamin MD;  Location: [facility]     COLONOSCOPY N/A 11/23/2015    Procedure: COLONOSCOPY;  Surgeon: Tana Benjamin MD;  Location: [facility]     COLONOSCOPY N/A 12/13/2017    Procedure: COLONOSCOPY;  COLONOSCOPY WITH POLYPECTOMY;  Surgeon: Arthur Alaniz DO;  Location: [facility]     COLONOSCOPY N/A 3/4/2021    Procedure: COLONOSCOPY;  Surgeon: Joe Howard MD;  Location: [facility]     ESOPHAGOSCOPY, GASTROSCOPY, DUODENOSCOPY (EGD), COMBINED N/A 2/12/2016    Procedure: COMBINED ESOPHAGOSCOPY, GASTROSCOPY, DUODENOSCOPY (EGD);  Surgeon: Arthur Alaniz DO;  Location: [facility]     GYN SURGERY      complete hyst     Laproscopic-assisted resection of right colon neoplasm  2012     TONSILLECTOMY  1955       Social History     Tobacco Use     Smoking status: Former Smoker     Packs/day: 1.00     Years: 25.00     Pack years: 25.00     Types: Cigarettes     Smokeless tobacco: Never Used     Tobacco comment: year quit 1989   Substance Use Topics     Alcohol use: Yes     Comment: 1 beer daily. last drink, last night.     Family History   Problem Relation Age of Onset     Other - See Comments Father         (82 Diagnosis); cause of death     C.A.D. Father      Cancer Mother 38        pancreatic             Current Outpatient Medications   Medication Sig Dispense Refill     albuterol (PROAIR HFA/PROVENTIL HFA/VENTOLIN HFA) 108 (90 Base) MCG/ACT inhaler Inhale 2 puffs into the lungs every 6 hours as needed for shortness of breath / dyspnea or wheezing 18 g 1     Ascorbic Acid (VITAMIN C) 500 MG CAPS Take  by mouth. 1 cap daily       benzonatate (TESSALON) 100 MG capsule TAKE 1 CAPSULE BY MOUTH THREE TIMES DAILY       dexamethasone (DECADRON) 4 MG tablet        MAGNESIUM GLUCONATE PO Take 400 mg by mouth 2 times daily       Multiple Vitamins-Iron (MULTIVITAMIN/IRON PO) Take by mouth daily 1 daily        "OLANZapine (ZYPREXA) 5 MG tablet        pravastatin (PRAVACHOL) 20 MG tablet Take 1 tablet by mouth once daily 90 tablet 0     prochlorperazine (COMPAZINE) 10 MG tablet TAKE 1 TABLET BY MOUTH EVERY 6 HOURS FOR 30 DAYS AS NEEDED FOR NAUSEA AND VOMITING       VITAMIN E 1 tab daily           Allergies   Allergen Reactions     Fosamax [Alendronic Acid] Cramps     Muscle cramps         Recent Labs   Lab Test 08/11/21  1048 07/29/20  1009 12/12/19  1110   LDL 84 82 111*   HDL 62 71 60   TRIG 173* 94 150*   ALT 31 36 52*   CR 0.94 0.85 0.94   GFRESTIMATED 61 69 62   GFRESTBLACK  --  80 71   POTASSIUM 4.1 4.4 4.5   TSH 1.82 0.66 1.35          Review of Systems  Constitutional, HEENT, cardiovascular, pulmonary, GI, , musculoskeletal, neuro, skin, endocrine and psych systems are negative, except as otherwise noted.      OBJECTIVE:   /54 (BP Location: Right arm, Patient Position: Sitting, Cuff Size: Adult Regular)   Pulse 86   Temp 99.9  F (37.7  C) (Tympanic)   Resp 22   Wt 66.7 kg (147 lb)   SpO2 90%   BMI 24.46 kg/m   Estimated body mass index is 24.46 kg/m  as calculated from the following:    Height as of 6/1/21: 1.651 m (5' 5\").    Weight as of this encounter: 66.7 kg (147 lb).       Physical Exam  GENERAL: healthy, alert and no distress  EYES: Eyes grossly normal to inspection, PERRL and conjunctivae and sclerae normal  HENT: ear canals and TM's normal, nose and mouth without ulcers or lesions  NECK: no adenopathy, no asymmetry, masses, or scars and thyroid normal to palpation  RESP: lungs clear to auscultation - no rales, rhonchi or wheezes  CV: regular rate and rhythm, normal S1 S2, no S3 or S4, no murmur, click or rub, no peripheral edema and peripheral pulses strong  MS: no gross musculoskeletal defects noted, no edema  SKIN: no suspicious lesions or rashes  PSYCH: mentation appears normal, affect normal/bright          ASSESSMENT / PLAN:     1. Routine general medical examination at a [facility]" "facility  - All labs are reviewed and are normal    2. Hyperlipidemia LDL goal <100  - Continue plan of care    3. Gastroesophageal reflux disease with esophagitis without hemorrhage  - Continue plan of care    4. Adenocarcinoma of lung, unspecified laterality (H)  - Continue Follow-up with Oncology    5. Anxiety  - LORazepam (ATIVAN) 0.5 MG tablet; Take 1 tablet (0.5 mg) by mouth every 6 hours as needed for anxiety  Dispense: 30 tablet; Refill: 1        Patient has been advised of split billing requirements and indicates understanding: Yes       COUNSELING:  Reviewed preventive health counseling, as reflected in patient instructions       Regular exercise       Healthy diet/nutrition       Vision screening      Estimated body mass index is 24.46 kg/m  as calculated from the following:    Height as of 6/1/21: 1.651 m (5' 5\").    Weight as of this encounter: 66.7 kg (147 lb).        She reports that she has quit smoking. Her smoking use included cigarettes. She has a 25.00 pack-year smoking history. She has never used smokeless tobacco.      Appropriate preventive services were discussed with this patient, including applicable screening as appropriate for cardiovascular disease, diabetes, osteopenia/osteoporosis, and glaucoma.  As appropriate for age/gender, discussed screening for colorectal cancer, prostate cancer, breast cancer, and cervical cancer. Checklist reviewing preventive services available has been given to the patient.    Reviewed patients plan of care and provided an AVS. The Basic Care Plan (routine screening as documented in Health Maintenance) for Farida meets the Care Plan requirement. This Care Plan has been established and reviewed with the Patient.    Counseling Resources:  ATP IV Guidelines  Pooled Cohorts Equation Calculator  Breast Cancer Risk Calculator  Breast Cancer: Medication to Reduce Risk  FRAX Risk Assessment  ICSI Preventive Guidelines  Dietary Guidelines for Americans, 2010  USDA's " MyPlate  ASA Prophylaxis  Lung CA Screening    Irma Buchanan, JAGDISH  [facility] - [place] [facility]    Identified Health Risks:

## 2021-07-20 NOTE — PATIENT INSTRUCTIONS
ASSESSMENT / PLAN:     1. Routine general medical examination at a health care facility  - All labs are reviewed and are normal    2. Hyperlipidemia LDL goal <100  - Continue plan of care    3. Gastroesophageal reflux disease with esophagitis without hemorrhage  - Continue plan of care    4. Adenocarcinoma of lung, unspecified laterality (H)  - Continue Follow-up with Oncology    5. Anxiety  - LORazepam (ATIVAN) 0.5 MG tablet; Take 1 tablet (0.5 mg) by mouth every 6 hours as needed for anxiety  Dispense: 30 tablet; Refill: 1          Preventive Health Recommendations    See your health care provider every year to    Review health changes.     Discuss preventive care.      Review your medicines if your doctor has prescribed any.      You no longer need a yearly Pap test unless you've had an abnormal Pap test in the past 10 years. If you have vaginal symptoms, such as bleeding or discharge, be sure to talk with your provider about a Pap test.      Every 1 to 2 years, have a mammogram.  If you are over 69, talk with your health care provider about whether or not you want to continue having screening mammograms.      Every 10 years, have a colonoscopy. Or, have a yearly FIT test (stool test). These exams will check for colon cancer.       Have a cholesterol test every 5 years, or more often if your doctor advises it.       Have a diabetes test (fasting glucose) every three years. If you are at risk for diabetes, you should have this test more often.       At age 65, have a bone density scan (DEXA) to check for osteoporosis (brittle bone disease).    Shots:    Get a flu shot each year.    Get a tetanus shot every 10 years.    Talk to your doctor about your pneumonia vaccines. There are now two you should receive - Pneumovax (PPSV 23) and Prevnar (PCV 13).    Talk to your pharmacist about the shingles vaccine.    Talk to your doctor about the hepatitis B vaccine.    Nutrition:     Eat at least 5 servings of fruits and  vegetables each day.      Eat whole-grain bread, whole-wheat pasta and brown rice instead of white grains and rice.      Get adequate about Calcium and Vitamin D.     Lifestyle    Exercise at least 150 minutes a week (30 minutes a day, 5 days a week). This will help you control your weight and prevent disease.      Limit alcohol to one drink per day.      No smoking.       Wear sunscreen to prevent skin cancer.       See your dentist twice a year for an exam and cleaning.      See your eye doctor every 1 to 2 years to screen for conditions such as glaucoma, macular degeneration, cataracts, etc.    Personalized Prevention Plan  You are due for the preventive services outlined below.  Your care team is available to assist you in scheduling these services.  If you have already completed any of these items, please share that information with your care team to update in your medical record.    Health Maintenance Due   Topic Date Due     Diptheria Tetanus Pertussis (DTAP/TDAP/TD) Vaccine (1 - Tdap) Never done     Zoster (Shingles) Vaccine (1 of 2) Never done     Pneumococcal Vaccine (1 of 1 - PPSV23) Never done     Annual Wellness Visit  07/29/2021     Preventive Health Recommendations    See your health care provider every year to    Review health changes.     Discuss preventive care.      Review your medicines if your doctor has prescribed any.      You no longer need a yearly Pap test unless you've had an abnormal Pap test in the past 10 years. If you have vaginal symptoms, such as bleeding or discharge, be sure to talk with your provider about a Pap test.      Every 1 to 2 years, have a mammogram.  If you are over 69, talk with your health care provider about whether or not you want to continue having screening mammograms.      Every 10 years, have a colonoscopy. Or, have a yearly FIT test (stool test). These exams will check for colon cancer.       Have a cholesterol test every 5 years, or more often if your doctor  advises it.       Have a diabetes test (fasting glucose) every three years. If you are at risk for diabetes, you should have this test more often.       At age 65, have a bone density scan (DEXA) to check for osteoporosis (brittle bone disease).    Shots:    Get a flu shot each year.    Get a tetanus shot every 10 years.    Talk to your doctor about your pneumonia vaccines. There are now two you should receive - Pneumovax (PPSV 23) and Prevnar (PCV 13).    Talk to your pharmacist about the shingles vaccine.    Talk to your doctor about the hepatitis B vaccine.    Nutrition:     Eat at least 5 servings of fruits and vegetables each day.      Eat whole-grain bread, whole-wheat pasta and brown rice instead of white grains and rice.      Get adequate about Calcium and Vitamin D.     Lifestyle    Exercise at least 150 minutes a week (30 minutes a day, 5 days a week). This will help you control your weight and prevent disease.      Limit alcohol to one drink per day.      No smoking.       Wear sunscreen to prevent skin cancer.       See your dentist twice a year for an exam and cleaning.      See your eye doctor every 1 to 2 years to screen for conditions such as glaucoma, macular degeneration, cataracts, etc.    Personalized Prevention Plan  You are due for the preventive services outlined below.  Your care team is available to assist you in scheduling these services.  If you have already completed any of these items, please share that information with your care team to update in your medical record.    Health Maintenance Due   Topic Date Due     Diptheria Tetanus Pertussis (DTAP/TDAP/TD) Vaccine (1 - Tdap) Never done     Zoster (Shingles) Vaccine (1 of 2) Never done     Pneumococcal Vaccine (1 of 1 - PPSV23) Never done     Flu Vaccine (1) 09/01/2021

## 2021-07-30 NOTE — TELEPHONE ENCOUNTER
pravachol      Last Written Prescription Date:  7/29/2020  Last Fill Quantity: 90,   # refills: 3  Last Office Visit: 6/1/21  Future Office visit:    Next 5 appointments (look out 90 days)    Aug 30, 2021  3:00 PM  (Arrive by 2:45 PM)  PHYSICAL with Irma Buchanan CNP  [facility] ([facility] ) [address]  [facility] [address]  [phone]

## 2021-08-27 NOTE — TELEPHONE ENCOUNTER
"    Reason for Disposition    [1] CLOSE CONTACT COVID-19 EXPOSURE within last 14 days AND [2] needs COVID-19 lab test to return to work AND [3] NO symptoms    Additional Information    Negative: COVID-19 lab test positive    Negative: [1] Lives with someone known to have influenza (flu test positive) AND [2] flu-like symptoms (e.g., cough, runny nose, sore throat, SOB; with or without fever)    Negative: [1] Symptoms of COVID-19 (e.g., cough, fever, SOB, or others) AND [2] HCP diagnosed COVID-19 based on symptoms    Negative: [1] Symptoms of COVID-19 (e.g., cough, fever, SOB, or others) AND [2] lives in an area with community spread    Negative: [1] Symptoms of COVID-19 (e.g., cough, fever, SOB, or others) AND [2] within 14 days of EXPOSURE (close contact) with diagnosed or suspected COVID-19 patient    Negative: [1] Symptoms of COVID-19 (e.g., cough, fever, SOB, or others) AND [2] within 14 days of travel from high-risk area for COVID-19 community spread (identified by CDC)    Negative: [1] Difficulty breathing (shortness of breath) occurs AND [2] onset > 14 days after COVID-19 EXPOSURE (Close Contact)    Negative: [1] Dry cough occurs AND [2] onset > 14 days after COVID-19 EXPOSURE    Negative: [1] Wet cough (i.e., white-yellow, yellow, green, or chepe colored sputum) AND [2] onset > 14 days after COVID-19 EXPOSURE    Negative: [1] Common cold symptoms AND [2] onset > 14 days after COVID-19 EXPOSURE    Negative: [1] COVID-19 vaccine series completed (fully vaccinated) AND [2] COVID-19 EXPOSURE AND [3] no symptoms    Negative: COVID-19 vaccine reaction suspected (e.g., fever, headache, muscle aches) occurring during days 1-3 after getting vaccine    Negative: COVID-19 vaccine, questions about    Answer Assessment - Initial Assessment Questions  1. COVID-19 CLOSE CONTACT: \"Who is the person with the confirmed or suspected COVID-19 infection that you were exposed to?\"      friend  2. PLACE of CONTACT: \"Where were you " "when you were exposed to COVID-19?\" (e.g., home, school, medical waiting room; which city?)      home  3. TYPE of CONTACT: \"How much contact was there?\" (e.g., sitting next to, live in same house, work in same office, same building)      Sitting and talking  4. DURATION of CONTACT: \"How long were you in contact with the COVID-19 patient?\" (e.g., a few seconds, passed by person, a few minutes, 15 minutes or longer, live with the patient)      20 min  5. MASK: \"Were you wearing a mask?\" \"Was the other person wearing a mask?\" Note: wearing a mask reduces the risk of an otherwise close contact.      no  6. DATE of CONTACT: \"When did you have contact with a COVID-19 patient?\" (e.g., how many days ago)      8/22/21  7. COMMUNITY SPREAD: \"Are there lots of cases of COVID-19 (community spread) where you live?\" (See public health department website, if unsure)        yes  8. SYMPTOMS: \"Do you have any symptoms?\" (e.g., fever, cough, breathing difficulty, loss of taste or smell)      no  9. PREGNANCY OR POSTPARTUM: \"Is there any chance you are pregnant?\" \"When was your last menstrual period?\" \"Did you deliver in the last 2 weeks?\"      no  10. HIGH RISK: \"Do you have any heart or lung problems?\" \"Do you have a weak immune system?\" (e.g., heart failure, COPD, asthma, HIV positive, chemotherapy, renal failure, diabetes mellitus, sickle cell anemia, obesity)        Chemotherapy tx lung cancer  11. TRAVEL: \"Have you traveled out of the country recently?\" If so, \"When and where?\" Also ask about out-of-state travel, since the CDC has identified some high-risk cities for community spread in the . Note: Travel becomes less relevant if there is widespread community transmission where the patient lives.        no    Protocols used: CORONAVIRUS (COVID-19) EXPOSURE-A-AH 3.25      "

## 2021-08-30 PROBLEM — C34.90 ADENOCARCINOMA OF LUNG (H): Status: ACTIVE | Noted: 2021-01-01

## 2021-08-30 PROBLEM — F41.9 ANXIETY: Status: ACTIVE | Noted: 2021-01-01

## 2021-08-30 NOTE — NURSING NOTE
"Chief Complaint   Patient presents with     Physical       Initial /54 (BP Location: Right arm, Patient Position: Sitting, Cuff Size: Adult Regular)   Pulse 86   Temp 99.9  F (37.7  C) (Tympanic)   Resp 22   Wt 66.7 kg (147 lb)   SpO2 90%   BMI 24.46 kg/m   Estimated body mass index is 24.46 kg/m  as calculated from the following:    Height as of 6/1/21: 1.651 m (5' 5\").    Weight as of this encounter: 66.7 kg (147 lb).  Medication Reconciliation: complete  Marlyn Barnes LPN  "

## 2021-10-27 NOTE — TELEPHONE ENCOUNTER
9:18 AM    Reason for Call: OVERBOOK    Patient is having the following symptoms: issues with sinuses for 3 weeks. States her nose is bloody and scabbie    The patient is requesting an appointment for Overbook with marisabel.    Was an appointment offered for this call? No  If yes : Appointment type              Date    Preferred method for responding to this message: Telephone Call  What is your phone number ?  [phone]    If we cannot reach you directly, may we leave a detailed response at the number you provided? Yes    Can this message wait until your PCP/provider returns, if unavailable today? YES, Provider is in today    Judy Garay

## 2021-10-28 NOTE — PROGRESS NOTES
Assessment & Plan        Nasal congestion  - Otolaryngology Referral    Sinus pain  - Otolaryngology Referral      Irma Buchanan, JAGDISH  [facility] - [place] Iqbal is a 71 year old who presents for the following health issues       Concern - Epistaxis   Onset: 2 weeks  Description: bloody nose  Intensity: mild  Progression of Symptoms:  same  Accompanying Signs & Symptoms: nasal drainage  Previous history of similar problem: no  Precipitating factors:        Worsened by: blowing nose  Alleviating factors:        Improved by: nothing  Therapies tried and outcome:  none         Patient Active Problem List   Diagnosis     Hyperlipidemia LDL goal <100     Advanced care planning/counseling discussion     Osteopenia     Gastroesophageal reflux disease with esophagitis     History of colonic polyps - tubular adenoma - colonoscopy every 2 years     Taking a statin medication     Screening for malignant neoplasm of colon     Adenocarcinoma of lung (H)     Anxiety     Past Surgical History:   Procedure Laterality Date     APPENDECTOMY  1956     BIOPSY      polypectomy-benign     COLONOSCOPY  5/17/2012     COLONOSCOPY  5/30/2013    Procedure: COLONOSCOPY;  COLONOSCOPY WITH BIOPSIES;  Surgeon: Tana Benjamin MD;  [facility]     COLONOSCOPY  11/12/2013    Procedure: COLONOSCOPY;  WHOLE COLON COLONOSCOPY WITH POLYPECTOMY;  Surgeon: Tana Benjamin MD;  [facility]: [facility]     COLONOSCOPY N/A 11/23/2015    Procedure: COLONOSCOPY;  Surgeon: Tana Benjamin MD;  [facility]     COLONOSCOPY N/A 12/13/2017    Procedure: COLONOSCOPY;  COLONOSCOPY WITH POLYPECTOMY;  Surgeon: Arthur Alaniz DO;  [facility]     COLONOSCOPY N/A 3/4/2021    Procedure: COLONOSCOPY;  Surgeon: Joe Howard MD;  [facility]     ESOPHAGOSCOPY, GASTROSCOPY, DUODENOSCOPY (EGD), COMBINED N/A 2/12/2016    Procedure: COMBINED ESOPHAGOSCOPY, GASTROSCOPY, DUODENOSCOPY (EGD);  Surgeon: Arthur Alaniz DO;   [facility]: [facility]     GYN SURGERY      complete hyst     Laproscopic-assisted resection of right colon neoplasm  2012     TONSILLECTOMY  1955       Social History     Tobacco Use     Smoking status: Former Smoker     Packs/day: 1.00     Years: 25.00     Pack years: 25.00     Types: Cigarettes     Smokeless tobacco: Never Used     Tobacco comment: year quit 1989   Substance Use Topics     Alcohol use: Yes     Comment: 1 beer daily. last drink, last night.     Family History   Problem Relation Age of Onset     Other - See Comments Father         (82 Diagnosis); cause of death     C.A.D. Father      Cancer Mother 38        pancreatic           Current Outpatient Medications   Medication Sig Dispense Refill     albuterol (PROAIR HFA/PROVENTIL HFA/VENTOLIN HFA) 108 (90 Base) MCG/ACT inhaler Inhale 2 puffs into the lungs every 6 hours as needed for shortness of breath / dyspnea or wheezing 18 g 1     ALECENSA 150 MG CAPS        Ascorbic Acid (VITAMIN C) 500 MG CAPS Take  by mouth. 1 cap daily       benzonatate (TESSALON) 100 MG capsule TAKE 1 CAPSULE BY MOUTH THREE TIMES DAILY       FOLIC ACID PO Take 1 mg by mouth 2 times daily       LORazepam (ATIVAN) 0.5 MG tablet Take 1 tablet (0.5 mg) by mouth every 6 hours as needed for anxiety 30 tablet 1     MAGNESIUM GLUCONATE PO Take 400 mg by mouth 2 times daily       Multiple Vitamins-Iron (MULTIVITAMIN/IRON PO) Take by mouth daily 1 daily       pravastatin (PRAVACHOL) 20 MG tablet Take 1 tablet by mouth once daily 90 tablet 0     prochlorperazine (COMPAZINE) 10 MG tablet TAKE 1 TABLET BY MOUTH EVERY 6 HOURS FOR 30 DAYS AS NEEDED FOR NAUSEA AND VOMITING       VITAMIN E 1 tab daily         Allergies   Allergen Reactions     Fosamax [Alendronic Acid] Cramps     Muscle cramps       Recent Labs   Lab Test 08/11/21  1048 07/29/20  1009 12/12/19  1110 12/12/19  1110   LDL 84 82  --  111*   HDL 62 71  --  60   TRIG 173* 94  --  150*   ALT 31 36  --  52*   CR 0.94 0.85   < > 0.94    GFRESTIMATED 61 69   < > 62   GFRESTBLACK  --  80  --  71   POTASSIUM 4.1 4.4   < > 4.5   TSH 1.82 0.66   < > 1.35    < > = values in this interval not displayed.        BP Readings from Last 3 Encounters:   10/29/21 112/52   08/30/21 110/54   06/01/21 116/58    Wt Readings from Last 3 Encounters:   10/29/21 66.5 kg (146 lb 9.6 oz)   08/30/21 66.7 kg (147 lb)   06/01/21 68.3 kg (150 lb 8 oz)               Review of Systems   Constitutional, HEENT, cardiovascular, pulmonary, gi and gu systems are negative, except as otherwise noted.          Objective    /52 (BP Location: Right arm, Patient Position: Sitting, Cuff Size: Adult Regular)   Pulse 92   Temp 98.8  F (37.1  C) (Tympanic)   Resp 18   Wt 66.5 kg (146 lb 9.6 oz)   SpO2 (!) 88%   BMI 24.40 kg/m    Body mass index is 24.4 kg/m .         Physical Exam   GENERAL: healthy, alert and no distress  EYES: Eyes grossly normal to inspection, PERRL and conjunctivae and sclerae normal  HENT: nasal mucosa edematous. erythematous  NECK: no adenopathy, no asymmetry, masses, or scars and thyroid normal to palpation  RESP: lungs clear to auscultation - no rales, rhonchi or wheezes  CV: regular rate and rhythm, normal S1 S2, no S3 or S4, no murmur, click or rub, no peripheral edema and peripheral pulses strong  PSYCH: mentation appears normal, affect normal/bright

## 2021-10-29 NOTE — PATIENT INSTRUCTIONS
Assessment & Plan        Nasal congestion  - Otolaryngology Referral    Sinus pain  - Otolaryngology Referral      Irma Buchanan, JAGDISH  [facility] [place] [facility]

## 2021-10-29 NOTE — NURSING NOTE
"Chief Complaint   Patient presents with     Nose Problem       Initial /52 (BP Location: Right arm, Patient Position: Sitting, Cuff Size: Adult Regular)   Pulse 92   Temp 98.8  F (37.1  C) (Tympanic)   Resp 18   Wt 66.5 kg (146 lb 9.6 oz)   SpO2 (!) 88%   BMI 24.40 kg/m   Estimated body mass index is 24.4 kg/m  as calculated from the following:    Height as of 6/1/21: 1.651 m (5' 5\").    Weight as of this encounter: 66.5 kg (146 lb 9.6 oz).  Medication Reconciliation: complete  Marlyn Barnes LPN  "

## 2021-10-29 NOTE — PATIENT INSTRUCTIONS
Thank you for allowing Sofi Barrett and our ENT team to participate in your care.  If your medications are too expensive, please give the nurse a call.  We can possibly change this medication.  If you have a scheduling or an appointment question please contact our Health Unit Coordinator at their direct line [phone] ext [phone].   ALL nursing questions or concerns can be directed to your ENT nurse at: [phone] - [facility]     Start [place] nasal spray     Continue saline nasal spray    Start Aquaphor ointment     Try Breath Right Strips     Try Sue Ease Nasal gel     Follow up if worsening symptoms or no improvement

## 2021-10-29 NOTE — PROGRESS NOTES
Otolaryngology Note         Chief Complaint:     Patient presents with:  Sinusitis  Epistaxis  Nasal Congestion           History of Present Illness:     She reports scabs in her nose, some nasal congestion.  She feels symptoms have worsened over the past couple of weeks.  She is currently on oral chemotherapy through Dr [facility] for lung cancer. She was diagnosed in July of 2021.  She has shortness of breath at times.  She states some days are good, some days aren't.  Oxygen saturation bumps up and down.  She is not currently on oxygen but will start on nebulizer treatments through Irma Buchanan.      She is using saline nasal spray 3-4 times per day for the past couple of weeks.  No improvement since using this.  She has a humidifier in the bedroom.  During the day it isn't so bad, during the night it is worse.      No other sprays, uses a q-tip with water at times.    She is having blood with blowing the nose, no imelda epistaxis.  She also has some congestion in the nostrils, worse at night.  She feels like a mouth breather at night, right worse than left.  Obstruction has been present for the past 5-6 years, worse in winter    She drinks adequate water  She has minimal caffeine  She quit smoking at age 39.  Previous 1 ppd smoker.  25 year pack history    She is able to breath through nose.  Able to smell and taste.  No imelda PND.  No dysphagia.     No concerns for recurrent sinusitis.          Medications:     Current Outpatient Rx   Medication Sig Dispense Refill     albuterol (PROAIR HFA/PROVENTIL HFA/VENTOLIN HFA) 108 (90 Base) MCG/ACT inhaler Inhale 2 puffs into the lungs every 6 hours as needed for shortness of breath / dyspnea or wheezing 18 g 1     ALECENSA 150 MG CAPS        Ascorbic Acid (VITAMIN C) 500 MG CAPS Take  by mouth. 1 cap daily       benzonatate (TESSALON) 100 MG capsule TAKE 1 CAPSULE BY MOUTH THREE TIMES DAILY       FOLIC ACID PO Take 1 mg by mouth 2 times daily       LORazepam  (ATIVAN) 0.5 MG tablet Take 1 tablet (0.5 mg) by mouth every 6 hours as needed for anxiety 30 tablet 1     MAGNESIUM GLUCONATE PO Take 400 mg by mouth 2 times daily       Multiple Vitamins-Iron (MULTIVITAMIN/IRON PO) Take by mouth daily 1 daily       pravastatin (PRAVACHOL) 20 MG tablet Take 1 tablet by mouth once daily 90 tablet 0     prochlorperazine (COMPAZINE) 10 MG tablet TAKE 1 TABLET BY MOUTH EVERY 6 HOURS FOR 30 DAYS AS NEEDED FOR NAUSEA AND VOMITING       VITAMIN E 1 tab daily              Allergies:     Allergies: Fosamax [alendronic acid]          Past Medical History:     Past Medical History:   Diagnosis Date     Gastroesophageal reflux disease with esophagitis 1/27/2016     Hyperlipidemia LDL goal < 100 9/7/2012     Osteopenia 4/25/2014     Taking a statin medication 5/10/2017     Tubulovillous adenoma polyp of colon 9/7/2012            Past Surgical History:     Past Surgical History:   Procedure Laterality Date     APPENDECTOMY  1956     BIOPSY      polypectomy-benign     COLONOSCOPY  5/17/2012     COLONOSCOPY  5/30/2013    Procedure: COLONOSCOPY;  COLONOSCOPY WITH BIOPSIES;  Surgeon: Tana Benjamin MD;  [facility]     COLONOSCOPY  11/12/2013    Procedure: COLONOSCOPY;  WHOLE COLON COLONOSCOPY WITH POLYPECTOMY;  Surgeon: Tana Benjamin MD;  [facility]     COLONOSCOPY N/A 11/23/2015    Procedure: COLONOSCOPY;  Surgeon: Tana Benjamin MD;  [facility]     COLONOSCOPY N/A 12/13/2017    Procedure: COLONOSCOPY;  COLONOSCOPY WITH POLYPECTOMY;  Surgeon: Arthur Alaniz DO;  [facility]     COLONOSCOPY N/A 3/4/2021    Procedure: COLONOSCOPY;  Surgeon: Joe Howard MD;  [facility]     ESOPHAGOSCOPY, GASTROSCOPY, DUODENOSCOPY (EGD), COMBINED N/A 2/12/2016    Procedure: COMBINED ESOPHAGOSCOPY, GASTROSCOPY, DUODENOSCOPY (EGD);  Surgeon: Arthur Alaniz DO;  [facility]     GYN SURGERY      complete hyst     Laproscopic-assisted resection of right colon  neoplasm  2012     TONSILLECTOMY  1955       ENT family history reviewed         Social History:     Social History     Tobacco Use     Smoking status: Former Smoker     Packs/day: 1.00     Years: 25.00     Pack years: 25.00     Types: Cigarettes     Smokeless tobacco: Never Used     Tobacco comment: year quit 1989   Substance Use Topics     Alcohol use: Yes     Comment: 1 beer daily. last drink, last night.     Drug use: No            Review of Systems:     ROS: See HPI         Physical Exam:     /52 (BP Location: Right arm, Patient Position: Chair, Cuff Size: Adult Regular)   Pulse 92   Temp 98.8  F (37.1  C) (Tympanic)   Resp 18   Wt 66.2 kg (146 lb)   SpO2 (!) 88%   BMI 24.30 kg/m      General - The patient is well nourished and well developed, and appears to have good nutritional status.  Alert and oriented to person and place, answers questions and cooperates with examination appropriately.   Head and Face - Normocephalic and atraumatic, with no gross asymmetry noted.  The facial nerve is intact, with strong symmetric movements.  Voice and Breathing - The patient was breathing comfortably without the use of accessory muscles. There was no wheezing, stridor, or stertor.  The patients voice was clear and strong, and had appropriate pitch and quality.  Ears -The external auditory canals are patent, the tympanic membranes are intact without effusion, retraction or mass.  Bony landmarks are intact.  Eyes - Extraocular movements intact, sclera were not icteric or injected, conjunctiva were pink and moist.  Mouth - Examination of the oral cavity showed pink, healthy oral mucosa. No lesions or ulcerations noted.  The tongue was mobile and midline, and the dentition were in good condition.    Throat - The walls of the oropharynx were smooth, pink, moist, symmetric, and had no lesions or ulcerations.  The tonsillar pillars and soft palate were symmetric.  The uvula was midline on elevation.    Neck - Normal  midline excursion of the laryngotracheal complex during swallowing.  Full range of motion on passive movement.  Palpation of the occipital, submental, submandibular, internal jugular chain, and supraclavicular nodes did not demonstrate any abnormal lymph nodes or masses.  Palpation of the thyroid was soft and smooth, with no nodules or goiter appreciated.  The trachea was mobile and midline.  Nose - External contour is symmetric, no gross deflection or scars.  Nasal mucosa is pink and moist with no abnormal mucus.  The septum was intact and the turbinates are enlarged.  No polyps, masses, or purulence noted on examination.    To evaluate the nose and sinuses, I performed rigid nasal endoscopy.  I sprayed both nares with 2 sprays lidocaine and neosynephrine.     I began with the RIGHT side using a 0 degree rigid nasal endoscope, and then similarly examined the LEFT side     Findings:  Dryness and excoriation in bilateral septum, right > worse.  Slight deviated nasal septum right  Inferior turbinates:  normal  Middle turbinate and middle meatus:  Normal,  No purulence, no polyposis  Mucosa is healthy throughout,  no polyps nor polypoid degeneration  Nasopharynx clear, ET patent, no edema  The patient tolerated the procedure well            Assessment and Plan:       ICD-10-CM    1. Nasal dryness  J34.89    2. Nose irritation  J34.89        Start [place] nasal spray   Continue saline nasal spray  Start Aquaphor ointment   Try Breath Right Strips for night time congestion  Try Sue Ease Nasal gel if you need to start on oxygen  Follow up if worsening symptoms or no improvement     Sofi Barrett NP-C  [facility]

## 2021-10-29 NOTE — NURSING NOTE
"Chief Complaint   Patient presents with     Sinusitis     Epistaxis     Nasal Congestion       Initial /52 (BP Location: Right arm, Patient Position: Chair, Cuff Size: Adult Regular)   Pulse 92   Temp 98.8  F (37.1  C) (Tympanic)   Resp 18   Wt 66.2 kg (146 lb)   SpO2 (!) 88%   BMI 24.30 kg/m   Estimated body mass index is 24.3 kg/m  as calculated from the following:    Height as of 6/1/21: 1.651 m (5' 5\").    Weight as of this encounter: 66.2 kg (146 lb).  Medication Reconciliation: complete  Liss Guzman LPN    "

## 2021-10-29 NOTE — LETTER
10/29/2021         RE: Farida Baldwin  [address] [address]        Dear Colleague,    Thank you for referring your patient, Farida Baldwin, to the [facility]. Please see a copy of my visit note below.    Otolaryngology Note         Chief Complaint:     Patient presents with:  Sinusitis  Epistaxis  Nasal Congestion           History of Present Illness:     She reports scabs in her nose, some nasal congestion.  She feels symptoms have worsened over the past couple of weeks.  She is currently on oral chemotherapy through Dr [facility] for lung cancer. She was diagnosed in July of 2021.  She has shortness of breath at times.  She states some days are good, some days aren't.  Oxygen saturation bumps up and down.  She is not currently on oxygen but will start on nebulizer treatments through Irma Buchanan.      She is using saline nasal spray 3-4 times per day for the past couple of weeks.  No improvement since using this.  She has a humidifier in the bedroom.  During the day it isn't so bad, during the night it is worse.      No other sprays, uses a q-tip with water at times.    She is having blood with blowing the nose, no imelda epistaxis.  She also has some congestion in the nostrils, worse at night.  She feels like a mouth breather at night, right worse than left.  Obstruction has been present for the past 5-6 years, worse in winter    She drinks adequate water  She has minimal caffeine  She quit smoking at age 39.  Previous 1 ppd smoker.  25 year pack history    She is able to breath through nose.  Able to smell and taste.  No imelda PND.  No dysphagia.     No concerns for recurrent sinusitis.          Medications:     Current Outpatient Rx   Medication Sig Dispense Refill     albuterol (PROAIR HFA/PROVENTIL HFA/VENTOLIN HFA) 108 (90 Base) MCG/ACT inhaler Inhale 2 puffs into the lungs every 6 hours as needed for shortness of breath / dyspnea or wheezing 18 g 1     ALECENSA  150 MG CAPS        Ascorbic Acid (VITAMIN C) 500 MG CAPS Take  by mouth. 1 cap daily       benzonatate (TESSALON) 100 MG capsule TAKE 1 CAPSULE BY MOUTH THREE TIMES DAILY       FOLIC ACID PO Take 1 mg by mouth 2 times daily       LORazepam (ATIVAN) 0.5 MG tablet Take 1 tablet (0.5 mg) by mouth every 6 hours as needed for anxiety 30 tablet 1     MAGNESIUM GLUCONATE PO Take 400 mg by mouth 2 times daily       Multiple Vitamins-Iron (MULTIVITAMIN/IRON PO) Take by mouth daily 1 daily       pravastatin (PRAVACHOL) 20 MG tablet Take 1 tablet by mouth once daily 90 tablet 0     prochlorperazine (COMPAZINE) 10 MG tablet TAKE 1 TABLET BY MOUTH EVERY 6 HOURS FOR 30 DAYS AS NEEDED FOR NAUSEA AND VOMITING       VITAMIN E 1 tab daily              Allergies:     Allergies: Fosamax [alendronic acid]          Past Medical History:     Past Medical History:   Diagnosis Date     Gastroesophageal reflux disease with esophagitis 1/27/2016     Hyperlipidemia LDL goal < 100 9/7/2012     Osteopenia 4/25/2014     Taking a statin medication 5/10/2017     Tubulovillous adenoma polyp of colon 9/7/2012            Past Surgical History:     Past Surgical History:   Procedure Laterality Date     APPENDECTOMY  1956     BIOPSY      polypectomy-benign     COLONOSCOPY  5/17/2012     COLONOSCOPY  5/30/2013    Procedure: COLONOSCOPY;  COLONOSCOPY WITH BIOPSIES;  Surgeon: Tana Benjamin MD;  [facility]     COLONOSCOPY  11/12/2013    Procedure: COLONOSCOPY;  WHOLE COLON COLONOSCOPY WITH POLYPECTOMY;  Surgeon: Tana Benjamin MD;  [facility]     COLONOSCOPY N/A 11/23/2015    Procedure: COLONOSCOPY;  Surgeon: Tana Benjamin MD;  [facility]     COLONOSCOPY N/A 12/13/2017    Procedure: COLONOSCOPY;  COLONOSCOPY WITH POLYPECTOMY;  Surgeon: Arthur Alaniz DO;  [facility]     COLONOSCOPY N/A 3/4/2021    Procedure: COLONOSCOPY;  Surgeon: Joe Howard MD;  [facility]     ESOPHAGOSCOPY, GASTROSCOPY, DUODENOSCOPY  (EGD), COMBINED N/A 2/12/2016    Procedure: COMBINED ESOPHAGOSCOPY, GASTROSCOPY, DUODENOSCOPY (EGD);  Surgeon: Arthur Alaniz DO;  [facility]: [facility]     GYN SURGERY      complete hyst     Laproscopic-assisted resection of right colon neoplasm  2012     TONSILLECTOMY  1955       ENT family history reviewed         Social History:     Social History     Tobacco Use     Smoking status: Former Smoker     Packs/day: 1.00     Years: 25.00     Pack years: 25.00     Types: Cigarettes     Smokeless tobacco: Never Used     Tobacco comment: year quit 1989   Substance Use Topics     Alcohol use: Yes     Comment: 1 beer daily. last drink, last night.     Drug use: No            Review of Systems:     ROS: See HPI         Physical Exam:     /52 (BP Location: Right arm, Patient Position: Chair, Cuff Size: Adult Regular)   Pulse 92   Temp 98.8  F (37.1  C) (Tympanic)   Resp 18   Wt 66.2 kg (146 lb)   SpO2 (!) 88%   BMI 24.30 kg/m      General - The patient is well nourished and well developed, and appears to have good nutritional status.  Alert and oriented to person and place, answers questions and cooperates with examination appropriately.   Head and Face - Normocephalic and atraumatic, with no gross asymmetry noted.  The facial nerve is intact, with strong symmetric movements.  Voice and Breathing - The patient was breathing comfortably without the use of accessory muscles. There was no wheezing, stridor, or stertor.  The patients voice was clear and strong, and had appropriate pitch and quality.  Ears -The external auditory canals are patent, the tympanic membranes are intact without effusion, retraction or mass.  Bony landmarks are intact.  Eyes - Extraocular movements intact, sclera were not icteric or injected, conjunctiva were pink and moist.  Mouth - Examination of the oral cavity showed pink, healthy oral mucosa. No lesions or ulcerations noted.  The tongue was mobile and midline, and the dentition were in  good condition.    Throat - The walls of the oropharynx were smooth, pink, moist, symmetric, and had no lesions or ulcerations.  The tonsillar pillars and soft palate were symmetric.  The uvula was midline on elevation.    Neck - Normal midline excursion of the laryngotracheal complex during swallowing.  Full range of motion on passive movement.  Palpation of the occipital, submental, submandibular, internal jugular chain, and supraclavicular nodes did not demonstrate any abnormal lymph nodes or masses.  Palpation of the thyroid was soft and smooth, with no nodules or goiter appreciated.  The trachea was mobile and midline.  Nose - External contour is symmetric, no gross deflection or scars.  Nasal mucosa is pink and moist with no abnormal mucus.  The septum was intact and the turbinates are enlarged.  No polyps, masses, or purulence noted on examination.    To evaluate the nose and sinuses, I performed rigid nasal endoscopy.  I sprayed both nares with 2 sprays lidocaine and neosynephrine.     I began with the RIGHT side using a 0 degree rigid nasal endoscope, and then similarly examined the LEFT side     Findings:  Dryness and excoriation in bilateral septum, right > worse.  Slight deviated nasal septum right  Inferior turbinates:  normal  Middle turbinate and middle meatus:  Normal,  No purulence, no polyposis  Mucosa is healthy throughout,  no polyps nor polypoid degeneration  Nasopharynx clear, ET patent, no edema  The patient tolerated the procedure well            Assessment and Plan:       ICD-10-CM    1. Nasal dryness  J34.89    2. Nose irritation  J34.89        Start [place] nasal spray   Continue saline nasal spray  Start Aquaphor ointment   Try Breath Right Strips for night time congestion  Try Sue Ease Nasal gel if you need to start on oxygen  Follow up if worsening symptoms or no improvement     Sofi Barrett NP-C  [facility]        Again, thank you for allowing me to participate in the  care of your patient.        Sincerely,        Sofi Barrett, NP

## 2021-11-02 NOTE — TELEPHONE ENCOUNTER
pravastatin      Last Written Prescription Date:  7/30/21  Last Fill Quantity: 90,   # refills: 0  Last Office Visit: 10/29/21  Future Office visit:

## 2021-11-26 NOTE — TELEPHONE ENCOUNTER
8:30 AM    Reason for Call:     Description:     On Oxygen (humidified) at home, nasal irritation occurring. Daughter reports patient has scabs within nares. Denies irritation to external skin.     Patient has tried [place] Jelly and saline nasal spray in nares with no relief.     Daughter, Zhanna requesting a return call.       Was an appointment offered for this call? No  If yes : Appointment type              Date    Preferred method for responding to this message: Telephone Call  What is your phone number- [phone]    If we cannot reach you directly, may we leave a detailed response at the number you provided? Yes    Can this message wait until your PCP/provider returns, if available today?   Not applicable      Suzan Fuentes, MONTRELL

## 2021-11-26 NOTE — TELEPHONE ENCOUNTER
Have her try aquaphor and update oncology next week.  Also, is oxygen humidified?    [facility]  [phone]

## 2022-01-01 ENCOUNTER — TRANSFERRED RECORDS (OUTPATIENT)
Dept: HEALTH INFORMATION MANAGEMENT | Facility: CLINIC | Age: 72
End: 2022-01-01
Payer: COMMERCIAL

## 2022-01-01 ENCOUNTER — TRANSCRIBE ORDERS (OUTPATIENT)
Dept: OTHER | Age: 72
End: 2022-01-01
Payer: COMMERCIAL

## 2022-01-01 ENCOUNTER — VIRTUAL VISIT (OUTPATIENT)
Dept: ONCOLOGY | Facility: CLINIC | Age: 72
End: 2022-01-01
Attending: INTERNAL MEDICINE
Payer: COMMERCIAL

## 2022-01-01 ENCOUNTER — LAB (OUTPATIENT)
Dept: LAB | Facility: CLINIC | Age: 72
End: 2022-01-01
Payer: COMMERCIAL

## 2022-01-01 ENCOUNTER — DOCUMENTATION ONLY (OUTPATIENT)
Dept: ONCOLOGY | Facility: CLINIC | Age: 72
End: 2022-01-01

## 2022-01-01 ENCOUNTER — TELEPHONE (OUTPATIENT)
Dept: OTOLARYNGOLOGY | Facility: OTHER | Age: 72
End: 2022-01-01
Payer: COMMERCIAL

## 2022-01-01 ENCOUNTER — PATIENT OUTREACH (OUTPATIENT)
Dept: ONCOLOGY | Facility: CLINIC | Age: 72
End: 2022-01-01

## 2022-01-01 ENCOUNTER — PRE VISIT (OUTPATIENT)
Dept: ONCOLOGY | Facility: CLINIC | Age: 72
End: 2022-01-01
Payer: COMMERCIAL

## 2022-01-01 ENCOUNTER — OFFICE VISIT (OUTPATIENT)
Dept: OTOLARYNGOLOGY | Facility: OTHER | Age: 72
End: 2022-01-01
Attending: NURSE PRACTITIONER
Payer: COMMERCIAL

## 2022-01-01 ENCOUNTER — TRANSFERRED RECORDS (OUTPATIENT)
Dept: HEALTH INFORMATION MANAGEMENT | Facility: CLINIC | Age: 72
End: 2022-01-01

## 2022-01-01 ENCOUNTER — MEDICAL CORRESPONDENCE (OUTPATIENT)
Dept: HEALTH INFORMATION MANAGEMENT | Facility: CLINIC | Age: 72
End: 2022-01-01

## 2022-01-01 VITALS
HEIGHT: 66 IN | SYSTOLIC BLOOD PRESSURE: 122 MMHG | HEART RATE: 84 BPM | DIASTOLIC BLOOD PRESSURE: 58 MMHG | WEIGHT: 144 LBS | TEMPERATURE: 98.1 F | OXYGEN SATURATION: 84 % | BODY MASS INDEX: 23.14 KG/M2

## 2022-01-01 DIAGNOSIS — J34.89 NOSE IRRITATION: Primary | ICD-10-CM

## 2022-01-01 DIAGNOSIS — E78.5 HYPERLIPIDEMIA LDL GOAL <100: ICD-10-CM

## 2022-01-01 DIAGNOSIS — C34.91 ADENOCARCINOMA, LUNG, RIGHT (H): Primary | ICD-10-CM

## 2022-01-01 DIAGNOSIS — C34.90 ADENOCARCINOMA OF LUNG (H): Primary | ICD-10-CM

## 2022-01-01 DIAGNOSIS — R04.0 EPISTAXIS: ICD-10-CM

## 2022-01-01 DIAGNOSIS — Z78.9 ON SUPPLEMENTAL OXYGEN BY NASAL CANNULA: ICD-10-CM

## 2022-01-01 DIAGNOSIS — C34.81 MALIGNANT NEOPLASM OF OVERLAPPING SITES OF RIGHT LUNG (H): Primary | ICD-10-CM

## 2022-01-01 DIAGNOSIS — C34.91 ADENOCARCINOMA OF RIGHT LUNG (H): Primary | ICD-10-CM

## 2022-01-01 LAB
PATH REPORT.COMMENTS IMP SPEC: ABNORMAL
PATH REPORT.COMMENTS IMP SPEC: YES
PATH REPORT.COMMENTS IMP SPEC: YES
PATH REPORT.FINAL DX SPEC: ABNORMAL
PATH REPORT.FINAL DX SPEC: ABNORMAL
PATH REPORT.GROSS SPEC: ABNORMAL
PATH REPORT.GROSS SPEC: ABNORMAL
PATH REPORT.MICROSCOPIC SPEC OTHER STN: ABNORMAL
PATH REPORT.MICROSCOPIC SPEC OTHER STN: ABNORMAL
PATH REPORT.RELEVANT HX SPEC: ABNORMAL
PATH REPORT.SITE OF ORIGIN SPEC: ABNORMAL
PATH REPORT.SITE OF ORIGIN SPEC: ABNORMAL

## 2022-01-01 PROCEDURE — G0463 HOSPITAL OUTPT CLINIC VISIT: HCPCS | Mod: 25

## 2022-01-01 PROCEDURE — 99213 OFFICE O/P EST LOW 20 MIN: CPT | Mod: 25 | Performed by: NURSE PRACTITIONER

## 2022-01-01 PROCEDURE — 99205 OFFICE O/P NEW HI 60 MIN: CPT | Mod: 95 | Performed by: INTERNAL MEDICINE

## 2022-01-01 PROCEDURE — 31231 NASAL ENDOSCOPY DX: CPT | Performed by: NURSE PRACTITIONER

## 2022-01-01 PROCEDURE — 88321 CONSLTJ&REPRT SLD PREP ELSWR: CPT | Mod: GC | Performed by: PATHOLOGY

## 2022-01-01 PROCEDURE — 88321 CONSLTJ&REPRT SLD PREP ELSWR: CPT | Performed by: PATHOLOGY

## 2022-01-01 RX ORDER — PRAVASTATIN SODIUM 20 MG
TABLET ORAL
Qty: 90 TABLET | Refills: 0 | Status: SHIPPED | OUTPATIENT
Start: 2022-01-01

## 2022-01-01 ASSESSMENT — PAIN SCALES - GENERAL: PAINLEVEL: NO PAIN (0)

## 2022-01-01 ASSESSMENT — MIFFLIN-ST. JEOR: SCORE: 1171.99

## 2022-01-18 NOTE — TELEPHONE ENCOUNTER
3:14 PM    Reason for Call: Phone Call    Description: Patient called stating she is still having sinus pressure and nose bleeds. Scheduled for follow up with Arnold in [place] 1/28/22.Patient would like call back to see if theres anything she can do prior to her appointment to help. She is on oxygen, so she can only use certain meds. Please call back    Was an appointment offered for this call? Yes  If yes : Appointment type              Date: 1/28/22    Preferred method for responding to this message: Telephone Call  What is your phone number ? [phone]    If we cannot reach you directly, may we leave a detailed response at the number you provided? Yes    Can this message wait until your PCP/provider returns, if available today? YES    Zena Wells

## 2022-01-19 NOTE — TELEPHONE ENCOUNTER
Recommend trying roberto -ease nasal lotion, (will likely have to order online)  use ayr gel instead of saline.  Has she been treated with abx?

## 2022-01-20 NOTE — TELEPHONE ENCOUNTER
Called pt and pt said she is currently using the Sue Ease lotion, was concerned that Chelsea gel is not safe to use with oxygen therapy.  I assured her that it is ok. She was on a prednisone and abx but ended 2 weeks ago.

## 2022-01-28 NOTE — PATIENT INSTRUCTIONS
Thank you for allowing Sofi Arnold and our ENT team to participate in your care.  If your medications are too expensive, please give the nurse a call.  We can possibly change this medication.  If you have a scheduling or an appointment question please contact our Health Unit Coordinator at their direct line [phone].   ALL nursing questions or concerns can be directed to your ENT nurse at: [phone] - Sushila     Continue to use ayr gel or nasal saline in your nose several times per day    If you have a bleed, use afrin nasal spray (or generic equivalent) on a cotton ball and place it in the affected nostril    To clear crusted boogers in your nose, use several sprays of saline nasal spray and come roberto-ease lotion and let it sit on the septum for several minutes to soften, then gently rinse your nose with Arie med sinus rinse - use warm distilled water.   You can use this unlimited times per day to rinse the nose    Follow up if bleeding persists.  Go to the ER with brisk bleed that does not stop after ~ 20 min or if you have any other symptoms with bleeding

## 2022-01-28 NOTE — PROGRESS NOTES
Otolaryngology Note         Chief Complaint:     Patient presents with:  Epistaxis  Nasal Congestion  Sinus Problem           History of Present Illness:     Farida Baldwin presents with ongoing concerns with nosebleeds.   She reports nasal congestion during the night, she blows her nose in the morning.  She blows out bloody scabs and then it bleeds for a little while.  Usually bleeds for about 5 minutes until it slows down.  She has been using ayr gel but feels like it is plugging up her nose.  She does not tolerate having nasal congestion and blows her nose frequently.      She is having daily nosebleeds, right > left.  No real feeling of blood running down her throat.      She has associated post nasal drip and congestion.    She is on 5 lpm oxygen at all times.  She has an oxygen concentrator with humidity bubbler  They are also using a kettle on the stove with humidity   She also has a humidifier at night  She is currently on oral chemo but had recent PET scan, concerns for changes, may change to different chemo  She follows through [facility] [place]    Reports no nasal trauma or nasal surgery. Denies nasal injury.    No history of anticoagulant use  No of frequent ASA or NSAID use  No history of hypertension   No history of bleeding or clotting disorders  She was last seen in ENT in October 2021 for nasal dryness, no epistaxis at the time.            Medications:     Current Outpatient Rx   Medication Sig Dispense Refill     albuterol (PROAIR HFA/PROVENTIL HFA/VENTOLIN HFA) 108 (90 Base) MCG/ACT inhaler Inhale 2 puffs into the lungs every 6 hours as needed for shortness of breath / dyspnea or wheezing 18 g 1     ALECENSA 150 MG CAPS        Ascorbic Acid (VITAMIN C) 500 MG CAPS Take  by mouth. 1 cap daily       benzonatate (TESSALON) 100 MG capsule TAKE 1 CAPSULE BY MOUTH THREE TIMES DAILY       FOLIC ACID PO Take 1 mg by mouth 2 times daily       LORazepam (ATIVAN) 0.5 MG tablet Take 1  tablet (0.5 mg) by mouth every 6 hours as needed for anxiety 30 tablet 1     MAGNESIUM GLUCONATE PO Take 400 mg by mouth 2 times daily       Multiple Vitamins-Iron (MULTIVITAMIN/IRON PO) Take by mouth daily 1 daily       prochlorperazine (COMPAZINE) 10 MG tablet TAKE 1 TABLET BY MOUTH EVERY 6 HOURS FOR 30 DAYS AS NEEDED FOR NAUSEA AND VOMITING       TRELEGY ELLIPTA 200-62.5-25 MCG/INH oral inhaler INHALE 1 PUFF INTO THE LUNGS ONCE A DAY       VITAMIN E 1 tab daily       pravastatin (PRAVACHOL) 20 MG tablet Take 1 tablet by mouth once daily 90 tablet 0            Allergies:     Allergies: Fosamax [alendronic acid]          Past Medical History:     Past Medical History:   Diagnosis Date     Gastroesophageal reflux disease with esophagitis 1/27/2016     Hyperlipidemia LDL goal < 100 9/7/2012     Osteopenia 4/25/2014     Taking a statin medication 5/10/2017     Tubulovillous adenoma polyp of colon 9/7/2012            Past Surgical History:     Past Surgical History:   Procedure Laterality Date     APPENDECTOMY  1956     BIOPSY      polypectomy-benign     COLONOSCOPY  5/17/2012     COLONOSCOPY  5/30/2013    Procedure: COLONOSCOPY;  COLONOSCOPY WITH BIOPSIES;  Surgeon: Tana Benjamin MD;  [facility]     COLONOSCOPY  11/12/2013    Procedure: COLONOSCOPY;  WHOLE COLON COLONOSCOPY WITH POLYPECTOMY;  Surgeon: Tana Benjamin MD;  [facility]     COLONOSCOPY N/A 11/23/2015    Procedure: COLONOSCOPY;  Surgeon: Tana Benjamin MD;  [facility]     COLONOSCOPY N/A 12/13/2017    Procedure: COLONOSCOPY;  COLONOSCOPY WITH POLYPECTOMY;  Surgeon: Arthur Alaniz DO;  [facility]     COLONOSCOPY N/A 3/4/2021    Procedure: COLONOSCOPY;  Surgeon: Joe Howard MD;  [facility]: [facility]     ESOPHAGOSCOPY, GASTROSCOPY, DUODENOSCOPY (EGD), COMBINED N/A 2/12/2016    Procedure: COMBINED ESOPHAGOSCOPY, GASTROSCOPY, DUODENOSCOPY (EGD);  Surgeon: Arthur Alaniz DO;  Location: [facility]     GYN SURGERY       "complete hyst     Laproscopic-assisted resection of right colon neoplasm  2012     TONSILLECTOMY  1955       ENT family history reviewed         Social History:     Social History     Tobacco Use     Smoking status: Former Smoker     Packs/day: 1.00     Years: 25.00     Pack years: 25.00     Types: Cigarettes     Smokeless tobacco: Never Used     Tobacco comment: year quit 1989   Substance Use Topics     Alcohol use: Yes     Comment: 1 beer daily. last drink, last night.     Drug use: No            Review of Systems:     ROS: See HPI         Physical Exam:     /58 (BP Location: Left arm, Patient Position: Sitting, Cuff Size: Adult Regular)   Pulse 84   Temp 98.1  F (36.7  C)   Ht 1.664 m (5' 5.5\")   Wt 65.3 kg (144 lb)   SpO2 (!) 84%   BMI 23.60 kg/m      General - The patient is well nourished and well developed, and appears to have good nutritional status.  Alert and oriented to person and place, answers questions and cooperates with examination appropriately.   Head and Face - Normocephalic and atraumatic, with no gross asymmetry noted.  The facial nerve is intact, with strong symmetric movements.  Voice and Breathing - The patient was breathing comfortably without the use of accessory muscles. There was no wheezing, stridor. The patients voice was clear and strong, and had appropriate pitch and quality.  Ears - External ear normal. Canals are patent. Right tympanic membrane is intact without effusion, retraction or mass. Left tympanic membrane is intact without effusion, retraction or mass.  Eyes - Extraocular movements intact, sclera were not icteric or injected  Mouth - Examination of the oral cavity showed pink, healthy oral mucosa. Dentition in good condition. No lesions or ulcerations noted. The tongue was mobile and midline.   Throat - The walls of the oropharynx were smooth, pink, moist, symmetric, and had no lesions or ulcerations.  The tonsillar pillars and soft palate were symmetric. The " uvula was midline on elevation.    Neck - Normal midline excursion of the laryngotracheal complex during swallowing.  Full range of motion on passive movement.  Palpation of the occipital, submental, submandibular, internal jugular chain, and supraclavicular nodes did not demonstrate any abnormal lymph nodes or masses.  Palpation of the thyroid was soft and smooth, with no nodules or goiter appreciated.  The trachea was mobile and midline.  Nose - External contour is symmetric, no gross deflection or scars.  Nasal mucosa is pink and moist with no abnormal mucus.  The septum and turbinates were evaluated with nasal speculum, bilateral caudal septum is excoriated, crusted dried scabbed area.  No active bleeding at this time.     To evaluate the nose and sinuses, I performed rigid nasal endoscopy.  I sprayed both nares with 2 sprays lidocaine and neosynephrine.     I began with the RIGHT side using a 0 degree rigid nasal endoscope, and then similarly examined the LEFT side     Findings:  Right > left caudal septum irritation and excoriation.  I placed her oxygen in her mouth and placed bacitracin in both nostrils to soften the crusting.  I was able to gently suction the crusting off the caudal septum.  Right and left septum has diffuse excoriation right is worse than left.  There is no obvious prominent vasculature or specific areas to cauterize.    Inferior turbinates:  dried, mild excoriation  Middle turbinate and middle meatus:  No purulence, no polyposis       Nasal Cautery - Options were explained to the patient regarding conservative measures versus nasal cautery in the clinic today.  I recommended avoiding cautery today as there is no obvious areas of prominent vasculature to cauterize.  If we cauterized the right septum, we there will further crusting.  We need to focus on healing the area.           Assessment and Plan:       ICD-10-CM    1. Nose irritation  J34.89    2. Epistaxis  R04.0    3. On supplemental  oxygen by nasal cannula  Z78.9      We cut down the prongs on her nasal cannula to avoid friction in the caudal septum.     Continue to use ayr gel or nasal saline in your nose several times per day    If you have a bleed, use afrin nasal spray (or generic equivalent) on a cotton ball and place it in the affected nostril    To clear crusted boogers in your nose, use several sprays of saline nasal spray and come roberto-ease lotion and let it sit on the septum for several minutes to soften, then gently rinse your nose with Arie med sinus rinse - use warm distilled water.   You can use this unlimited times per day to rinse the nose    Follow up if bleeding persists.  Go to the ER with brisk bleed that does not stop after ~ 20 min or if you have any other symptoms with bleeding    Follow up if symptoms persist.       Sofi Barrett NP-C  [facility]

## 2022-01-28 NOTE — NURSING NOTE
"Chief Complaint   Patient presents with     Epistaxis     Nasal Congestion     Sinus Problem       Initial /58 (BP Location: Left arm, Patient Position: Sitting, Cuff Size: Adult Regular)   Pulse 84   Temp 98.1  F (36.7  C)   Ht 1.664 m (5' 5.5\")   Wt 65.3 kg (144 lb)   SpO2 (!) 84%   BMI 23.60 kg/m   Estimated body mass index is 23.6 kg/m  as calculated from the following:    Height as of this encounter: 1.664 m (5' 5.5\").    Weight as of this encounter: 65.3 kg (144 lb).  Medication Reconciliation: complete  Sushila Hillman LPN    "

## 2022-01-28 NOTE — LETTER
1/28/2022         RE: Farida Baldwin  [address] [address]        Dear Colleague,    Thank you for referring your patient, Farida Baldwin, to the [facility]. Please see a copy of my visit note below.      Otolaryngology Note         Chief Complaint:     Patient presents with:  Epistaxis  Nasal Congestion  Sinus Problem           History of Present Illness:     Farida Baldwin presents with ongoing concerns with nosebleeds.   She reports nasal congestion during the night, she blows her nose in the morning.  She blows out bloody scabs and then it bleeds for a little while.  Usually bleeds for about 5 minutes until it slows down.  She has been using ayr gel but feels like it is plugging up her nose.  She does not tolerate having nasal congestion and blows her nose frequently.      She is having daily nosebleeds, right > left.  No real feeling of blood running down her throat.      She has associated post nasal drip and congestion.    She is on 5 lpm oxygen at all times.  She has an oxygen concentrator with humidity bubbler  They are also using a kettle on the stove with humidity   She also has a humidifier at night  She is currently on oral chemo but had recent PET scan, concerns for changes, may change to different chemo  She follows through [facility] [place]    Reports no nasal trauma or nasal surgery. Denies nasal injury.    No history of anticoagulant use  No of frequent ASA or NSAID use  No history of hypertension   No history of bleeding or clotting disorders  She was last seen in ENT in October 2021 for nasal dryness, no epistaxis at the time.            Medications:     Current Outpatient Rx   Medication Sig Dispense Refill     albuterol (PROAIR HFA/PROVENTIL HFA/VENTOLIN HFA) 108 (90 Base) MCG/ACT inhaler Inhale 2 puffs into the lungs every 6 hours as needed for shortness of breath / dyspnea or wheezing 18 g 1     ALECENSA 150 MG CAPS        Ascorbic  Acid (VITAMIN C) 500 MG CAPS Take  by mouth. 1 cap daily       benzonatate (TESSALON) 100 MG capsule TAKE 1 CAPSULE BY MOUTH THREE TIMES DAILY       FOLIC ACID PO Take 1 mg by mouth 2 times daily       LORazepam (ATIVAN) 0.5 MG tablet Take 1 tablet (0.5 mg) by mouth every 6 hours as needed for anxiety 30 tablet 1     MAGNESIUM GLUCONATE PO Take 400 mg by mouth 2 times daily       Multiple Vitamins-Iron (MULTIVITAMIN/IRON PO) Take by mouth daily 1 daily       prochlorperazine (COMPAZINE) 10 MG tablet TAKE 1 TABLET BY MOUTH EVERY 6 HOURS FOR 30 DAYS AS NEEDED FOR NAUSEA AND VOMITING       TRELEGY ELLIPTA 200-62.5-25 MCG/INH oral inhaler INHALE 1 PUFF INTO THE LUNGS ONCE A DAY       VITAMIN E 1 tab daily       pravastatin (PRAVACHOL) 20 MG tablet Take 1 tablet by mouth once daily 90 tablet 0            Allergies:     Allergies: Fosamax [alendronic acid]          Past Medical History:     Past Medical History:   Diagnosis Date     Gastroesophageal reflux disease with esophagitis 1/27/2016     Hyperlipidemia LDL goal < 100 9/7/2012     Osteopenia 4/25/2014     Taking a statin medication 5/10/2017     Tubulovillous adenoma polyp of colon 9/7/2012            Past Surgical History:     Past Surgical History:   Procedure Laterality Date     APPENDECTOMY  1956     BIOPSY      polypectomy-benign     COLONOSCOPY  5/17/2012     COLONOSCOPY  5/30/2013    Procedure: COLONOSCOPY;  COLONOSCOPY WITH BIOPSIES;  Surgeon: Tana Benjamin MD;  [facility]     COLONOSCOPY  11/12/2013    Procedure: COLONOSCOPY;  WHOLE COLON COLONOSCOPY WITH POLYPECTOMY;  Surgeon: Tana Benjamin MD;  [facility]     COLONOSCOPY N/A 11/23/2015    Procedure: COLONOSCOPY;  Surgeon: Tana Benjamin MD;  [facility]     COLONOSCOPY N/A 12/13/2017    Procedure: COLONOSCOPY;  COLONOSCOPY WITH POLYPECTOMY;  Surgeon: Arthur Alaniz DO;  [facility]     COLONOSCOPY N/A 3/4/2021    Procedure: COLONOSCOPY;  Surgeon: Joe Howard  "MD;  [facility]     ESOPHAGOSCOPY, GASTROSCOPY, DUODENOSCOPY (EGD), COMBINED N/A 2/12/2016    Procedure: COMBINED ESOPHAGOSCOPY, GASTROSCOPY, DUODENOSCOPY (EGD);  Surgeon: Arthur Alaniz DO;  Location: [facility]     GYN SURGERY      complete hyst     Laproscopic-assisted resection of right colon neoplasm  2012     TONSILLECTOMY  1955       ENT family history reviewed         Social History:     Social History     Tobacco Use     Smoking status: Former Smoker     Packs/day: 1.00     Years: 25.00     Pack years: 25.00     Types: Cigarettes     Smokeless tobacco: Never Used     Tobacco comment: year quit 1989   Substance Use Topics     Alcohol use: Yes     Comment: 1 beer daily. last drink, last night.     Drug use: No            Review of Systems:     ROS: See HPI         Physical Exam:     /58 (BP Location: Left arm, Patient Position: Sitting, Cuff Size: Adult Regular)   Pulse 84   Temp 98.1  F (36.7  C)   Ht 1.664 m (5' 5.5\")   Wt 65.3 kg (144 lb)   SpO2 (!) 84%   BMI 23.60 kg/m      General - The patient is well nourished and well developed, and appears to have good nutritional status.  Alert and oriented to person and place, answers questions and cooperates with examination appropriately.   Head and Face - Normocephalic and atraumatic, with no gross asymmetry noted.  The facial nerve is intact, with strong symmetric movements.  Voice and Breathing - The patient was breathing comfortably without the use of accessory muscles. There was no wheezing, stridor. The patients voice was clear and strong, and had appropriate pitch and quality.  Ears - External ear normal. Canals are patent. Right tympanic membrane is intact without effusion, retraction or mass. Left tympanic membrane is intact without effusion, retraction or mass.  Eyes - Extraocular movements intact, sclera were not icteric or injected  Mouth - Examination of the oral cavity showed pink, healthy oral mucosa. Dentition in good condition. " No lesions or ulcerations noted. The tongue was mobile and midline.   Throat - The walls of the oropharynx were smooth, pink, moist, symmetric, and had no lesions or ulcerations.  The tonsillar pillars and soft palate were symmetric. The uvula was midline on elevation.    Neck - Normal midline excursion of the laryngotracheal complex during swallowing.  Full range of motion on passive movement.  Palpation of the occipital, submental, submandibular, internal jugular chain, and supraclavicular nodes did not demonstrate any abnormal lymph nodes or masses.  Palpation of the thyroid was soft and smooth, with no nodules or goiter appreciated.  The trachea was mobile and midline.  Nose - External contour is symmetric, no gross deflection or scars.  Nasal mucosa is pink and moist with no abnormal mucus.  The septum and turbinates were evaluated with nasal speculum, bilateral caudal septum is excoriated, crusted dried scabbed area.  No active bleeding at this time.     To evaluate the nose and sinuses, I performed rigid nasal endoscopy.  I sprayed both nares with 2 sprays lidocaine and neosynephrine.     I began with the RIGHT side using a 0 degree rigid nasal endoscope, and then similarly examined the LEFT side     Findings:  Right > left caudal septum irritation and excoriation.  I placed her oxygen in her mouth and placed bacitracin in both nostrils to soften the crusting.  I was able to gently suction the crusting off the caudal septum.  Right and left septum has diffuse excoriation right is worse than left.  There is no obvious prominent vasculature or specific areas to cauterize.    Inferior turbinates:  dried, mild excoriation  Middle turbinate and middle meatus:  No purulence, no polyposis       Nasal Cautery - Options were explained to the patient regarding conservative measures versus nasal cautery in the clinic today.  I recommended avoiding cautery today as there is no obvious areas of prominent vasculature to  cauterize.  If we cauterized the right septum, we there will further crusting.  We need to focus on healing the area.           Assessment and Plan:       ICD-10-CM    1. Nose irritation  J34.89    2. Epistaxis  R04.0    3. On supplemental oxygen by nasal cannula  Z78.9      We cut down the prongs on her nasal cannula to avoid friction in the caudal septum.     Continue to use ayr gel or nasal saline in your nose several times per day    If you have a bleed, use afrin nasal spray (or generic equivalent) on a cotton ball and place it in the affected nostril    To clear crusted boogers in your nose, use several sprays of saline nasal spray and come roberto-ease lotion and let it sit on the septum for several minutes to soften, then gently rinse your nose with Arie med sinus rinse - use warm distilled water.   You can use this unlimited times per day to rinse the nose    Follow up if bleeding persists.  Go to the ER with brisk bleed that does not stop after ~ 20 min or if you have any other symptoms with bleeding    Follow up if symptoms persist.       Sofi ONEAL  [facility]        Again, thank you for allowing me to participate in the care of your patient.        Sincerely,        Sofi Barrett NP

## 2022-02-02 NOTE — TELEPHONE ENCOUNTER
Pravastatin  Last Written Prescription Date: 11/2/21  Last Fill Quantity: 90 # of Refills: 0  Last Office Visit: 10/29/21

## 2022-02-09 NOTE — PROGRESS NOTES
Action February 9, 2022 2:06 PM ABT   Action Taken Records from [facility] received.     Image request sent to [facility].

## 2022-02-09 NOTE — PROGRESS NOTES
[facility] for trials & 2nd opinion for pt with lung cancer.     I don't have comprehensive records to review, just last MD note from [facility].     7/2021 bronchoscopy RUL lung mass positive for NSCLC    PET shows no mets outside thoracic area, but there is LLL mass concerning for mets. Pt is not a candidate for radiation or surgery; she started alectinib but recent imaging shows progressive disease. Referring to [place] [facility] for other treatment options.     Records are still forthcoming; will proceed to schedule next Lung Med Onc within 2wks (Per Dr Zarate, not DTC appropriate at this time).

## 2022-02-14 NOTE — TELEPHONE ENCOUNTER
RECORDS STATUS - ALL OTHER DIAGNOSIS      RECORDS RECEIVED FROM: [facility]   DATE RECEIVED:    NOTES STATUS DETAILS   OFFICE NOTE from referring provider [facility]   OFFICE NOTE from medical oncologist [facility]: 22   DISCHARGE SUMMARY from hospital     DISCHARGE REPORT from the ER     OPERATIVE REPORT Epic 21: Bronchoscopy   MEDICATION LIST     PFT [facility] 21   LABS     PATHOLOGY REPORTS [facility], report rec'd & sent to HIM  & slides requested   [employer] 21   ANYTHING RELATED TO DIAGNOSIS     GENONOMIC TESTING     TYPE: Requested  - [facility] 2021: PDL - 1   IMAGING (NEED IMAGES & REPORT)     XR PACS 21, 3/26/21: [facility]   CT SCANS PACS 21, 21: [facility]    21: [facility]   [facility]   MAMMO PACS 7/15/21: Epic   [facility] 21[facility]   [facility] 21[facility]

## 2022-02-16 NOTE — LETTER
2/16/2022         RE: Farida Baldwin  [address] [address]        Dear Colleague,    Thank you for referring your patient, Farida Baldwin, to the [facility]. Please see a copy of my visit note below.    Farida is a 71 year old who is being evaluated via a billable video visit.      How would you like to obtain your AVS? Mail a copy  If the video visit is dropped, the invitation should be resent by: Send to e-mail at: [email or web address]  Will anyone else be joining your video visit?   Yes,  will sit in on video visit         Inez TANG    Video Start Time: 9:15  Video-Visit Details    Type of service:  Video Visit    Video End Time:10:00 AM    Originating Location (pt. Location): Home    Distant Location (provider location):  [facility]     Platform used for Video Visit: Kathrin            CC:  NSCLC    HPI:  The patient is a 70 yo woman who was diagnosed with NSCLC in July of 2021.  She had noticed she had a cough without hemoptysis since early 2021.  She had 3 rounds of antibiotics.  Scans demonstrated a large 10 cm mass in the RUL and associated atelectasis.  There was also extensive mediastinal and subcarinal adenopathy.  The patient is a former smoker with a 25 pack year history.  She quit in 1989.  A bronchoscopic biopsy was performed on July 26 of 2021 in [place] which revealed an adenocarcinoma.  Brain MRI was negative.  PD-L1 was 0%.  While awaiting the molecular analysis, the patient was started on carboplatinum and pemetrexed.  She received one cycle of this therapy.  Molecular analysis the returned and showed there was as ALK fusion.  She was started on alectinib 150 mg po every day.   The patient was tolerating the therapy well, but developed hypoxia in the fall of 2021.  She was hospitalized and evaluation was made revealing either pneumonia, progression, or durg related pneumonitis.  Her hypoxia improved  "only slightly, and she is now on 5L oxygen at home.  Previously she did not require oxygen.   NNeither steroids nor antibiotics have improved the hypoxia much.     Initial review showed a mixed response to therapy with some response, but with slight progression in pulmonary nodules.  A plan was made to switch her to lorlatinib.    We talked for over 30 minutes about options.  In general, I support the concept of using Lorlatinib.  I told the patient I would suggest a circulating tumor DNA assay (\"liquid biopsy\") from a vendor such as [employer] or [employer].  The conjugate fusion partner of ALK may determine differential sensitivity to alectinib, brigatinib, or lorlatinib.  However, in the meantime, as the patient has lorlatinib in hand at her home, I would consider starting the drug now.    [facility] included a small number of ALK patients and demonstrated a good response to relatively standard platinum based doublet therapy in conjunction with Tecentriq and Avastin.    ROS:    10 point ROS is non-contributory except as above.    LAB:    Our most recent lab tests are unremarkable.  Similar recent lab tests from [facility] are also non revealing.    CBC RESULTS:     Lab Test 08/11/21  1056   WBC 4.8   RBC 4.22   HGB 13.9   HCT 39.6   MCV 94   MCH 32.9   MCHC 35.1   RDW 12.7        Last Comprehensive Metabolic Panel:  Sodium   Date Value Ref Range Status   08/11/2021 140 133 - 144 mmol/L Final   07/29/2020 140 133 - 144 mmol/L Final     Potassium   Date Value Ref Range Status   08/11/2021 4.1 3.4 - 5.3 mmol/L Final   07/29/2020 4.4 3.4 - 5.3 mmol/L Final     Chloride   Date Value Ref Range Status   08/11/2021 108 94 - 109 mmol/L Final   07/29/2020 107 94 - 109 mmol/L Final     Carbon Dioxide   Date Value Ref Range Status   07/29/2020 26 20 - 32 mmol/L Final     Carbon Dioxide (CO2)   Date Value Ref Range Status   08/11/2021 24 20 - 32 mmol/L Final     Anion Gap "   Date Value Ref Range Status   08/11/2021 8 3 - 14 mmol/L Final   07/29/2020 7 3 - 14 mmol/L Final     Glucose   Date Value Ref Range Status   08/11/2021 100 (H) 70 - 99 mg/dL Final   07/29/2020 110 (H) 70 - 99 mg/dL Final     Comment:     Fasting specimen     Urea Nitrogen   Date Value Ref Range Status   08/11/2021 14 7 - 30 mg/dL Final   07/29/2020 16 7 - 30 mg/dL Final     Creatinine   Date Value Ref Range Status   08/11/2021 0.94 0.52 - 1.04 mg/dL Final   07/29/2020 0.85 0.52 - 1.04 mg/dL Final     GFR Estimate   Date Value Ref Range Status   08/11/2021 61 >60 mL/min/1.73m2 Final     Comment:     As of July 11, 2021, eGFR is calculated by the CKD-EPI creatinine equation, without race adjustment. eGFR can be influenced by muscle mass, exercise, and diet. The reported eGFR is an estimation only and is only applicable if the renal function is stable.   07/29/2020 69 >60 mL/min/[1.73_m2] Final     Comment:     Non  GFR Calc  Starting 12/18/2018, serum creatinine based estimated GFR (eGFR) will be   calculated using the Chronic Kidney Disease Epidemiology Collaboration   (CKD-EPI) equation.       Calcium   Date Value Ref Range Status   08/11/2021 9.3 8.5 - 10.1 mg/dL Final   07/29/2020 9.2 8.5 - 10.1 mg/dL Final     Bilirubin Total   Date Value Ref Range Status   08/11/2021 1.4 (H) 0.2 - 1.3 mg/dL Final   07/29/2020 1.1 0.2 - 1.3 mg/dL Final     Alkaline Phosphatase   Date Value Ref Range Status   08/11/2021 52 40 - 150 U/L Final   07/29/2020 49 40 - 150 U/L Final     ALT   Date Value Ref Range Status   08/11/2021 31 0 - 50 U/L Final   07/29/2020 36 0 - 50 U/L Final     AST   Date Value Ref Range Status   08/11/2021 20 0 - 45 U/L Final   07/29/2020 24 0 - 45 U/L Final     IMAGING:      I have reviewed the available imaging and it shows residual disease in the right lung.    Images available from [facility] reveal a large RUL lung cancer with mediastinal adenopathy.  I have reviewed the available  scans myself.    MED:    Current Outpatient Medications   Medication     albuterol (PROAIR HFA/PROVENTIL HFA/VENTOLIN HFA) 108 (90 Base) MCG/ACT inhaler     ALECENSA 150 MG CAPS     Ascorbic Acid (VITAMIN C) 500 MG CAPS     benzonatate (TESSALON) 100 MG capsule     FOLIC ACID PO     LORazepam (ATIVAN) 0.5 MG tablet     MAGNESIUM GLUCONATE PO     Multiple Vitamins-Iron (MULTIVITAMIN/IRON PO)     pravastatin (PRAVACHOL) 20 MG tablet     prochlorperazine (COMPAZINE) 10 MG tablet     TRELEGY ELLIPTA 200-62.5-25 MCG/INH oral inhaler     VITAMIN E     No current facility-administered medications for this visit.     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 Father      Cancer Mother 38        pancreatic     SOCIAL HX:  Social History     Socioeconomic History     Marital status:      Spouse name: Not on file     Number of children: Not on file     Years of education: Not on file     Highest education level: Not on file   Occupational History     Not on file   Tobacco Use     Smoking status: Former Smoker     Packs/day: 1.00     Years: 25.00     Pack years: 25.00     Types: Cigarettes     Smokeless tobacco: Never  Used     Tobacco comment: year quit 1989   Substance and Sexual Activity     Alcohol use: Yes     Comment: 1 beer daily. last drink, last night.     Drug use: No     Sexual activity: Yes     Partners: Male   Other Topics Concern      Service Not Asked     Blood Transfusions Yes     Caffeine Concern Yes     Comment: coffee - 4 cups daily     Occupational Exposure Not Asked     Hobby Hazards Not Asked     Sleep Concern Not Asked     Stress Concern Not Asked     Weight Concern Not Asked     Special Diet Not Asked     Back Care Not Asked     Exercise Not Asked     Bike Helmet Not Asked     Seat Belt Not Asked     Self-Exams Not Asked     Parent/sibling w/ CABG, MI or angioplasty before 65F 55M? No   Social History Narrative     Not on file     Social Determinants of Health     Financial Resource Strain: Not on file   Food Insecurity: Not on file   Transportation Needs: Not on file   Physical Activity: Not on file   Stress: Not on file   Social Connections: Not on file   Intimate Partner Violence: Not on file   Housing Stability: Not on file     PE:  On this video visit there was no retailed examination possible.  The patient was wearing supplemental oxygen and was oriented times 3.    A/P:  1) NSCLC:  The patient appears to have a stage 3 NSCLC by the data I have available.  The tumor possesses and ALK rearrangement, but she has not benefited from the standard robust response to therapy seen with ALk inhibitors.  A switch to lorlatinib at this point is reasonable and would be my recommendation.  I also would obtain a liquid biopsy of circulating tumor DNA at this time if possible.  If the initial molecular analysis is available, a review of that might also reveal if there is a conjugate fusion partner that makes the tumor more sensitive to lorlatinib or even the better tolerated brigatinib.      The patient does have a stage 3 cancer, although probably not surgically amenable or even radiation approachable given  its size and her hypoxia.  However, if she does not have a brisk response to lorlatinib I would consider carboplatinum, pemetrexed, Avastin, and Tecentriq with possible concurrent or sequential radiation after tumor shrinkage.      Also, if the cancer has a brisk response to lorlatinib, the possibility of subsequent curative radiation should be considered after the tumor has had a maximum response to therapy.  Finally, a year of durvalumab, even though the tumor is PD-L1 negative, after the radiation would be recommended if there is a good response to lorlatinib or cytotoxic chemotherapy and radiation.    I discussed this case today at length with my colleague Dr. Morales.  I will reach out to Dr. Steen to discuss our recommendations.  I spent over 60 minutes today discussing this case with colleagues, reviewing outside studies, and interviewing the patient.          Again, thank you for allowing me to participate in the care of your patient.      Sincerely,    Armin Arellano MD

## 2022-02-16 NOTE — PROGRESS NOTES
Farida is a 71 year old who is being evaluated via a billable video visit.      How would you like to obtain your AVS? Mail a copy  If the video visit is dropped, the invitation should be resent by: Send to e-mail at: [email or web address]  Will anyone else be joining your video visit?   Yes,  will sit in on video visit         Inez Weeksrio VF    Video Start Time: 9:15  Video-Visit Details    Type of service:  Video Visit    Video End Time:10:00 AM    Originating Location (pt. Location): Home    Distant Location (provider location):  [facility]     Platform used for Video Visit: Kathrin            CC:  NSCLC    HPI:  The patient is a 72 yo woman who was diagnosed with NSCLC in July of 2021.  She had noticed she had a cough without hemoptysis since early 2021.  She had 3 rounds of antibiotics.  Scans demonstrated a large 10 cm mass in the RUL and associated atelectasis.  There was also extensive mediastinal and subcarinal adenopathy.  The patient is a former smoker with a 25 pack year history.  She quit in 1989.  A bronchoscopic biopsy was performed on July 26 of 2021 in [place] which revealed an adenocarcinoma.  Brain MRI was negative.  PD-L1 was 0%.  While awaiting the molecular analysis, the patient was started on carboplatinum and pemetrexed.  She received one cycle of this therapy.  Molecular analysis the returned and showed there was as ALK fusion.  She was started on alectinib 150 mg po every day.   The patient was tolerating the therapy well, but developed hypoxia in the fall of 2021.  She was hospitalized and evaluation was made revealing either pneumonia, progression, or durg related pneumonitis.  Her hypoxia improved only slightly, and she is now on 5L oxygen at home.  Previously she did not require oxygen.   NNeither steroids nor antibiotics have improved the hypoxia much.     Initial review showed a mixed response to therapy with some response, but with slight  "progression in pulmonary nodules.  A plan was made to switch her to lorlatinib.    We talked for over 30 minutes about options.  In general, I support the concept of using Lorlatinib.  I told the patient I would suggest a circulating tumor DNA assay (\"liquid biopsy\") from a vendor such as [employer] or [employer].  The conjugate fusion partner of ALK may determine differential sensitivity to alectinib, brigatinib, or lorlatinib.  However, in the meantime, as the patient has lorlatinib in hand at her home, I would consider starting the drug now.    [facility] small number of ALK patients and demonstrated a good response to relatively standard platinum based doublet therapy in conjunction with Tecentriq and Avastin.    ROS:    10 point ROS is non-contributory except as above.    LAB:    Our most recent lab tests are unremarkable.  Similar recent lab tests from [facility] are also non revealing.    CBC RESULTS:     Lab Test 08/11/21  1056   WBC 4.8   RBC 4.22   HGB 13.9   HCT 39.6   MCV 94   MCH 32.9   MCHC 35.1   RDW 12.7        Last Comprehensive Metabolic Panel:  Sodium   Date Value Ref Range Status   08/11/2021 140 133 - 144 mmol/L Final   07/29/2020 140 133 - 144 mmol/L Final     Potassium   Date Value Ref Range Status   08/11/2021 4.1 3.4 - 5.3 mmol/L Final   07/29/2020 4.4 3.4 - 5.3 mmol/L Final     Chloride   Date Value Ref Range Status   08/11/2021 108 94 - 109 mmol/L Final   07/29/2020 107 94 - 109 mmol/L Final     Carbon Dioxide   Date Value Ref Range Status   07/29/2020 26 20 - 32 mmol/L Final     Carbon Dioxide (CO2)   Date Value Ref Range Status   08/11/2021 24 20 - 32 mmol/L Final     Anion Gap   Date Value Ref Range Status   08/11/2021 8 3 - 14 mmol/L Final   07/29/2020 7 3 - 14 mmol/L Final     Glucose   Date Value Ref Range Status   08/11/2021 100 (H) 70 - 99 mg/dL Final   07/29/2020 110 (H) 70 - 99 mg/dL Final     Comment:     Fasting " specimen     Urea Nitrogen   Date Value Ref Range Status   08/11/2021 14 7 - 30 mg/dL Final   07/29/2020 16 7 - 30 mg/dL Final     Creatinine   Date Value Ref Range Status   08/11/2021 0.94 0.52 - 1.04 mg/dL Final   07/29/2020 0.85 0.52 - 1.04 mg/dL Final     GFR Estimate   Date Value Ref Range Status   08/11/2021 61 >60 mL/min/1.73m2 Final     Comment:     As of July 11, 2021, eGFR is calculated by the CKD-EPI creatinine equation, without race adjustment. eGFR can be influenced by muscle mass, exercise, and diet. The reported eGFR is an estimation only and is only applicable if the renal function is stable.   07/29/2020 69 >60 mL/min/[1.73_m2] Final     Comment:     Non  GFR Calc  Starting 12/18/2018, serum creatinine based estimated GFR (eGFR) will be   calculated using the Chronic Kidney Disease Epidemiology Collaboration   (CKD-EPI) equation.       Calcium   Date Value Ref Range Status   08/11/2021 9.3 8.5 - 10.1 mg/dL Final   07/29/2020 9.2 8.5 - 10.1 mg/dL Final     Bilirubin Total   Date Value Ref Range Status   08/11/2021 1.4 (H) 0.2 - 1.3 mg/dL Final   07/29/2020 1.1 0.2 - 1.3 mg/dL Final     Alkaline Phosphatase   Date Value Ref Range Status   08/11/2021 52 40 - 150 U/L Final   07/29/2020 49 40 - 150 U/L Final     ALT   Date Value Ref Range Status   08/11/2021 31 0 - 50 U/L Final   07/29/2020 36 0 - 50 U/L Final     AST   Date Value Ref Range Status   08/11/2021 20 0 - 45 U/L Final   07/29/2020 24 0 - 45 U/L Final     IMAGING:      I have reviewed the available imaging and it shows residual disease in the right lung.    Images available from [facility] reveal a large RUL lung cancer with mediastinal adenopathy.  I have reviewed the available scans myself.    MED:    Current Outpatient Medications   Medication     albuterol (PROAIR HFA/PROVENTIL HFA/VENTOLIN HFA) 108 (90 Base) MCG/ACT inhaler     ALECENSA 150 MG CAPS     Ascorbic Acid (VITAMIN C) 500 MG CAPS     benzonatate (TESSALON) 100 MG  capsule     FOLIC ACID PO     LORazepam (ATIVAN) 0.5 MG tablet     MAGNESIUM GLUCONATE PO     Multiple Vitamins-Iron (MULTIVITAMIN/IRON PO)     pravastatin (PRAVACHOL) 20 MG tablet     prochlorperazine (COMPAZINE) 10 MG tablet     TRELEGY ELLIPTA 200-62.5-25 MCG/INH oral inhaler     VITAMIN E     No current facility-administered medications for this visit.     PMH:    Past Medical History:   Diagnosis Date     Gastroesophageal reflux disease with esophagitis 1/27/2016     Hyperlipidemia LDL goal < 100 9/7/2012     Osteopenia 4/25/2014     Taking a statin medication 5/10/2017     Tubulovillous adenoma polyp of colon 9/7/2012     PSH:    Past Surgical History:   Procedure Laterality Date     APPENDECTOMY  1956     BIOPSY       COLONOSCOPY  5/17/2012     COLONOSCOPY  5/30/2013     COLONOSCOPY  11/12/2013     COLONOSCOPY N/A 11/23/2015     COLONOSCOPY N/A 12/13/2017     COLONOSCOPY N/A 3/4/2021     ESOPHAGOSCOPY, GASTROSCOPY, DUODENOSCOPY (EGD), COMBINED N/A 2/12/2016     GYN SURGERY       Laproscopic-assisted resection of right colon neoplasm  2012     TONSILLECTOMY  1955     FAM HX:    Family History   Problem Relation Age of Onset     Other - See Comments Father         (82 Diagnosis); cause of death     C.A.D. Father      Cancer Mother 38        pancreatic     SOCIAL HX:    Social History     Socioeconomic History     Marital status:      Spouse name: Not on file     Number of children: Not on file     Years of education: Not on file     Highest education level: Not on file   Occupational History     Not on file   Tobacco Use     Smoking status: Former Smoker     Packs/day: 1.00     Years: 25.00     Pack years: 25.00     Types: Cigarettes     Smokeless tobacco: Never Used     Tobacco comment: year quit 1989   Substance and Sexual Activity     Alcohol use: Yes     Comment: 1 beer daily. last drink, last night.     Drug use: No     Sexual activity: Yes     Partners: Male   Other Topics Concern       Service Not Asked     Blood Transfusions Yes     Caffeine Concern Yes     Comment: coffee - 4 cups daily     Occupational Exposure Not Asked     Hobby Hazards Not Asked     Sleep Concern Not Asked     Stress Concern Not Asked     Weight Concern Not Asked     Special Diet Not Asked     Back Care Not Asked     Exercise Not Asked     Bike Helmet Not Asked     Seat Belt Not Asked     Self-Exams Not Asked     Parent/sibling w/ CABG, MI or angioplasty before 65F 55M? No   Social History Narrative     Not on file     Social Determinants of Health     Financial Resource Strain: Not on file   Food Insecurity: Not on file   Transportation Needs: Not on file   Physical Activity: Not on file   Stress: Not on file   Social Connections: Not on file   Intimate Partner Violence: Not on file   Housing Stability: Not on file     PE:    On this video visit there was no retailed examination possible.  The patient was wearing supplemental oxygen and was oriented times 3.    A/P:    1) NSCLC:  The patient appears to have a stage 3 NSCLC by the data I have available.  The tumor possesses and ALK rearrangement, but she has not benefited from the standard robust response to therapy seen with ALk inhibitors.  A switch to lorlatinib at this point is reasonable and would be my recommendation.  I also would obtain a liquid biopsy of circulating tumor DNA at this time if possible.  If the initial molecular analysis is available, a review of that might also reveal if there is a conjugate fusion partner that makes the tumor more sensitive to lorlatinib or even the better tolerated brigatinib.      The patient does have a stage 3 cancer, although probably not surgically amenable or even radiation approachable given its size and her hypoxia.  However, if she does not have a brisk response to lorlatinib I would consider carboplatinum, pemetrexed, Avastin, and Tecentriq with possible concurrent or sequential radiation after tumor shrinkage.      Also,  if the cancer has a brisk response to lorlatinib, the possibility of subsequent curative radiation should be considered after the tumor has had a maximum response to therapy.  Finally, a year of durvalumab, even though the tumor is PD-L1 negative, after the radiation would be recommended if there is a good response to lorlatinib or cytotoxic chemotherapy and radiation.    I discussed this case today at length with my colleague Dr. Morales.  I will reach out to Dr. Steen to discuss our recommendations.  I spent over 60 minutes today discussing this case with colleagues, reviewing outside studies, and interviewing the patient.

## 2022-03-07 NOTE — TELEPHONE ENCOUNTER
Pt calls, reports taking pravastatin 20mg, not 40mg.  called [facility] and changed dose,per Irma.   Pt will  tomorrow.  
Yes

## (undated) DEVICE — TUBING-SUCTION 20FT

## (undated) DEVICE — FORCEP-COLON BIOPSY LARGE W/NEEDLE 240CM

## (undated) DEVICE — CANISTER-SUCTION 2000CC

## (undated) DEVICE — IRRIGATION-H2O 1000ML

## (undated) DEVICE — CONNECTOR-ERBEFLO 2 PORT

## (undated) RX ORDER — PROPOFOL 10 MG/ML
INJECTION, EMULSION INTRAVENOUS
Status: DISPENSED
Start: 2017-12-13

## (undated) RX ORDER — LIDOCAINE HYDROCHLORIDE 20 MG/ML
INJECTION, SOLUTION EPIDURAL; INFILTRATION; INTRACAUDAL; PERINEURAL
Status: DISPENSED
Start: 2017-12-13